# Patient Record
Sex: FEMALE | Race: WHITE | Employment: OTHER | URBAN - METROPOLITAN AREA
[De-identification: names, ages, dates, MRNs, and addresses within clinical notes are randomized per-mention and may not be internally consistent; named-entity substitution may affect disease eponyms.]

---

## 2018-07-15 ENCOUNTER — HOSPITAL ENCOUNTER (EMERGENCY)
Age: 68
Discharge: HOME | End: 2018-07-15
Attending: EMERGENCY MEDICINE
Payer: MEDICARE

## 2018-07-15 VITALS
WEIGHT: 150 LBS | RESPIRATION RATE: 14 BRPM | HEIGHT: 61 IN | HEART RATE: 80 BPM | BODY MASS INDEX: 28.32 KG/M2 | OXYGEN SATURATION: 99 % | TEMPERATURE: 98.6 F | DIASTOLIC BLOOD PRESSURE: 61 MMHG | SYSTOLIC BLOOD PRESSURE: 109 MMHG

## 2018-07-15 DIAGNOSIS — S81.812A LACERATION OF LEFT LOWER EXTREMITY, INITIAL ENCOUNTER: Primary | ICD-10-CM

## 2018-07-15 PROCEDURE — 2500000003 HC RX 250 WO HCPCS: Performed by: EMERGENCY MEDICINE

## 2018-07-15 PROCEDURE — 90471 IMMUNIZATION ADMIN: CPT | Performed by: EMERGENCY MEDICINE

## 2018-07-15 PROCEDURE — 99282 EMERGENCY DEPT VISIT SF MDM: CPT

## 2018-07-15 PROCEDURE — 90715 TDAP VACCINE 7 YRS/> IM: CPT | Performed by: EMERGENCY MEDICINE

## 2018-07-15 PROCEDURE — 12002 RPR S/N/AX/GEN/TRNK2.6-7.5CM: CPT

## 2018-07-15 PROCEDURE — 6360000002 HC RX W HCPCS: Performed by: EMERGENCY MEDICINE

## 2018-07-15 RX ORDER — LIDOCAINE HYDROCHLORIDE AND EPINEPHRINE 10; 10 MG/ML; UG/ML
20 INJECTION, SOLUTION INFILTRATION; PERINEURAL ONCE
Status: COMPLETED | OUTPATIENT
Start: 2018-07-15 | End: 2018-07-15

## 2018-07-15 RX ADMIN — TETANUS TOXOID, REDUCED DIPHTHERIA TOXOID AND ACELLULAR PERTUSSIS VACCINE, ADSORBED 0.5 ML: 5; 2.5; 8; 8; 2.5 SUSPENSION INTRAMUSCULAR at 20:23

## 2018-07-15 RX ADMIN — LIDOCAINE HYDROCHLORIDE,EPINEPHRINE BITARTRATE 20 ML: 10; .01 INJECTION, SOLUTION INFILTRATION; PERINEURAL at 20:23

## 2018-07-15 ASSESSMENT — ENCOUNTER SYMPTOMS
SINUS PRESSURE: 0
BACK PAIN: 0
ABDOMINAL DISTENTION: 0
NAUSEA: 0
EYE PAIN: 0
SHORTNESS OF BREATH: 0
SORE THROAT: 0
DIARRHEA: 0
VOMITING: 0
WHEEZING: 0
EYE REDNESS: 0
COUGH: 0
EYE DISCHARGE: 0

## 2018-07-15 ASSESSMENT — PAIN SCALES - GENERAL
PAINLEVEL_OUTOF10: 0
PAINLEVEL_OUTOF10: 0

## 2018-07-16 NOTE — ED PROVIDER NOTES
ED ATTENDING NOTE    I saw and examined the patient. I discussed the history and examination with the resident and agree with the plan of care         HPI: Pt presents with laceration to LE, cut on a piece of glass, no FB, pt denies f/c, ha, cp, sob, cough, ap, n/v/d. Pt is lying in bed in no acute distress. --------------------------------------------- PAST HISTORY ---------------------------------------------  Past Medical History:  has a past medical history of Acid reflux; Hyperlipidemia; Hypertension; OCD (obsessive compulsive disorder); and Thyroid disease. Past Surgical History:  has a past surgical history that includes  section; Vocal Cord Augmentation W/Radiesse Inj; Total knee arthroplasty (Left); Ankle surgery (Right); Endoscopy, colon, diagnostic; and Colonoscopy. Social History:  reports that she has never smoked. She has never used smokeless tobacco. She reports that she does not drink alcohol or use drugs. Family History: family history includes Cancer in her brother; Heart Attack (age of onset: 50) in her father. The patients home medications have been reviewed. Allergies: Patient has no known allergies.     ROS: Review HPI for other details  Details in electronic record may supersede details below    Gen: no chills, fever  Eyes: no discharge, no change vision  ENT: no sore throat, no rhinitis  Cardiac: no chest pain, no palpitations  Resp: no sob, no cough  GI: no abd pain, no nausea, vomiting, or diarrhea  : no dysuria, urgency, change in color  MS: no back pain, joint pain, no falls, no trauma   Skin: no rash, no itch (+) laceration  Neuro: no dizziness, headache, no focal paralysis or parasthesia  Psych: no hallucinations, no depression  Heme: no bruising, no bleeding  Other relevant systems reviewed and negative    Physical brief exam: See HPI for other details  Details in electronic record may supersede details below    Vital signs reviewed, please refer to
There is no rebound and no guarding. Musculoskeletal: She exhibits no edema. Neurological: She is alert and oriented to person, place, and time. No cranial nerve deficit. Coordination normal.   Skin: Skin is warm and dry. 4 cm straight laceration to LLE without evidence of any retained foreign body   Nursing note and vitals reviewed. Procedures    MDM    Laceration without suspicion of foreign body. Primary repair performed. Tetanus updated. She will f/u with her PMD to have sutures removed.           --------------------------------------------- PAST HISTORY ---------------------------------------------  Past Medical History:  has a past medical history of Acid reflux; Hyperlipidemia; Hypertension; OCD (obsessive compulsive disorder); and Thyroid disease. Past Surgical History:  has a past surgical history that includes  section; Vocal Cord Augmentation W/Radiesse Inj; Total knee arthroplasty (Left); Ankle surgery (Right); Endoscopy, colon, diagnostic; and Colonoscopy. Social History:  reports that she has never smoked. She has never used smokeless tobacco. She reports that she does not drink alcohol or use drugs. Family History: family history includes Cancer in her brother; Heart Attack (age of onset: 50) in her father. The patients home medications have been reviewed. Allergies: Patient has no known allergies. -------------------------------------------------- RESULTS -------------------------------------------------  Labs:  No results found for this visit on 07/15/18. Radiology:  No orders to display       ------------------------- NURSING NOTES AND VITALS REVIEWED ---------------------------  Date / Time Roomed:  7/15/2018  7:54 PM  ED Bed Assignment:      The nursing notes within the ED encounter and vital signs as below have been reviewed.    /61   Pulse 80   Temp 98.6 °F (37 °C) (Oral)   Resp 14   Ht 5' 1\" (1.549 m)   Wt 150 lb (68 kg)   SpO2 99%

## 2018-09-10 ENCOUNTER — HOSPITAL ENCOUNTER (OUTPATIENT)
Age: 68
Discharge: HOME | End: 2018-09-12

## 2018-09-10 LAB
ABO/RH: NORMAL
ANION GAP SERPL CALCULATED.3IONS-SCNC: 12 MMOL/L (ref 7–16)
ANTIBODY SCREEN: NORMAL
APTT: 28.6 SEC (ref 24.5–35.1)
BASOPHILS ABSOLUTE: 0.05 E9/L (ref 0–0.2)
BASOPHILS RELATIVE PERCENT: 0.7 % (ref 0–2)
BILIRUBIN URINE: NEGATIVE
BLOOD, URINE: NEGATIVE
BUN BLDV-MCNC: 22 MG/DL (ref 8–23)
CALCIUM SERPL-MCNC: 9.5 MG/DL (ref 8.6–10.2)
CHLORIDE BLD-SCNC: 103 MMOL/L (ref 98–107)
CLARITY: CLEAR
CO2: 25 MMOL/L (ref 22–29)
COLOR: YELLOW
CREAT SERPL-MCNC: 1.1 MG/DL (ref 0.5–1)
EOSINOPHILS ABSOLUTE: 0.28 E9/L (ref 0.05–0.5)
EOSINOPHILS RELATIVE PERCENT: 3.7 % (ref 0–6)
GFR AFRICAN AMERICAN: >60
GFR NON-AFRICAN AMERICAN: 50 ML/MIN/1.73
GLUCOSE BLD-MCNC: 90 MG/DL (ref 74–109)
GLUCOSE URINE: NEGATIVE MG/DL
HCT VFR BLD CALC: 42.4 % (ref 34–48)
HEMOGLOBIN: 13.5 G/DL (ref 11.5–15.5)
IMMATURE GRANULOCYTES #: 0.02 E9/L
IMMATURE GRANULOCYTES %: 0.3 % (ref 0–5)
INR BLD: 1
KETONES, URINE: NEGATIVE MG/DL
LEUKOCYTE ESTERASE, URINE: NEGATIVE
LYMPHOCYTES ABSOLUTE: 2.06 E9/L (ref 1.5–4)
LYMPHOCYTES RELATIVE PERCENT: 27 % (ref 20–42)
MCH RBC QN AUTO: 30.1 PG (ref 26–35)
MCHC RBC AUTO-ENTMCNC: 31.8 % (ref 32–34.5)
MCV RBC AUTO: 94.4 FL (ref 80–99.9)
MONOCYTES ABSOLUTE: 0.6 E9/L (ref 0.1–0.95)
MONOCYTES RELATIVE PERCENT: 7.9 % (ref 2–12)
NEUTROPHILS ABSOLUTE: 4.61 E9/L (ref 1.8–7.3)
NEUTROPHILS RELATIVE PERCENT: 60.4 % (ref 43–80)
NITRITE, URINE: NEGATIVE
PDW BLD-RTO: 13.1 FL (ref 11.5–15)
PH UA: 6 (ref 5–9)
PLATELET # BLD: 211 E9/L (ref 130–450)
PMV BLD AUTO: 10.6 FL (ref 7–12)
POTASSIUM SERPL-SCNC: 4.6 MMOL/L (ref 3.5–5)
PROTEIN UA: NEGATIVE MG/DL
PROTHROMBIN TIME: 11.2 SEC (ref 9.3–12.4)
RBC # BLD: 4.49 E12/L (ref 3.5–5.5)
SODIUM BLD-SCNC: 140 MMOL/L (ref 132–146)
SPECIFIC GRAVITY UA: 1.02 (ref 1–1.03)
UROBILINOGEN, URINE: 0.2 E.U./DL
WBC # BLD: 7.6 E9/L (ref 4.5–11.5)

## 2018-09-10 PROCEDURE — 87088 URINE BACTERIA CULTURE: CPT

## 2018-09-10 PROCEDURE — 85025 COMPLETE CBC W/AUTO DIFF WBC: CPT

## 2018-09-10 PROCEDURE — 81003 URINALYSIS AUTO W/O SCOPE: CPT

## 2018-09-10 PROCEDURE — 87081 CULTURE SCREEN ONLY: CPT

## 2018-09-10 PROCEDURE — 86900 BLOOD TYPING SEROLOGIC ABO: CPT

## 2018-09-10 PROCEDURE — 86901 BLOOD TYPING SEROLOGIC RH(D): CPT

## 2018-09-10 PROCEDURE — 85730 THROMBOPLASTIN TIME PARTIAL: CPT

## 2018-09-10 PROCEDURE — 80048 BASIC METABOLIC PNL TOTAL CA: CPT

## 2018-09-10 PROCEDURE — 86850 RBC ANTIBODY SCREEN: CPT

## 2018-09-10 PROCEDURE — 85610 PROTHROMBIN TIME: CPT

## 2018-09-12 LAB — MRSA CULTURE ONLY: NORMAL

## 2018-09-13 LAB — URINE CULTURE, ROUTINE: NORMAL

## 2018-09-19 ENCOUNTER — HOSPITAL ENCOUNTER (OUTPATIENT)
Age: 68
Discharge: HOME | End: 2018-09-21

## 2018-09-19 PROCEDURE — 88311 DECALCIFY TISSUE: CPT

## 2018-09-19 PROCEDURE — 88305 TISSUE EXAM BY PATHOLOGIST: CPT

## 2018-09-20 ENCOUNTER — HOSPITAL ENCOUNTER (OUTPATIENT)
Age: 68
Discharge: HOME | End: 2018-09-22

## 2018-09-20 LAB
ANION GAP SERPL CALCULATED.3IONS-SCNC: 12 MMOL/L (ref 7–16)
BUN BLDV-MCNC: 18 MG/DL (ref 8–23)
CALCIUM SERPL-MCNC: 8.9 MG/DL (ref 8.6–10.2)
CHLORIDE BLD-SCNC: 97 MMOL/L (ref 98–107)
CO2: 24 MMOL/L (ref 22–29)
CREAT SERPL-MCNC: 1.3 MG/DL (ref 0.5–1)
GFR AFRICAN AMERICAN: 50
GFR NON-AFRICAN AMERICAN: 41 ML/MIN/1.73
GLUCOSE BLD-MCNC: 99 MG/DL (ref 74–109)
HCT VFR BLD CALC: 36.6 % (ref 34–48)
HEMOGLOBIN: 11.8 G/DL (ref 11.5–15.5)
MCH RBC QN AUTO: 30.6 PG (ref 26–35)
MCHC RBC AUTO-ENTMCNC: 32.2 % (ref 32–34.5)
MCV RBC AUTO: 95.1 FL (ref 80–99.9)
PDW BLD-RTO: 13 FL (ref 11.5–15)
PLATELET # BLD: 218 E9/L (ref 130–450)
PMV BLD AUTO: 11.2 FL (ref 7–12)
POTASSIUM SERPL-SCNC: 5.3 MMOL/L (ref 3.5–5)
RBC # BLD: 3.85 E12/L (ref 3.5–5.5)
SODIUM BLD-SCNC: 133 MMOL/L (ref 132–146)
WBC # BLD: 14.7 E9/L (ref 4.5–11.5)

## 2018-09-20 PROCEDURE — 80048 BASIC METABOLIC PNL TOTAL CA: CPT

## 2018-09-20 PROCEDURE — 85027 COMPLETE CBC AUTOMATED: CPT

## 2018-09-21 ENCOUNTER — HOSPITAL ENCOUNTER (OUTPATIENT)
Age: 68
Discharge: HOME | End: 2018-09-23

## 2018-09-21 LAB
ANION GAP SERPL CALCULATED.3IONS-SCNC: 12 MMOL/L (ref 7–16)
BUN BLDV-MCNC: 10 MG/DL (ref 8–23)
CALCIUM SERPL-MCNC: 9.1 MG/DL (ref 8.6–10.2)
CHLORIDE BLD-SCNC: 100 MMOL/L (ref 98–107)
CO2: 26 MMOL/L (ref 22–29)
CREAT SERPL-MCNC: 1.1 MG/DL (ref 0.5–1)
GFR AFRICAN AMERICAN: >60
GFR NON-AFRICAN AMERICAN: 50 ML/MIN/1.73
GLUCOSE BLD-MCNC: 100 MG/DL (ref 74–109)
HCT VFR BLD CALC: 34.6 % (ref 34–48)
HEMOGLOBIN: 11.1 G/DL (ref 11.5–15.5)
MCH RBC QN AUTO: 30.4 PG (ref 26–35)
MCHC RBC AUTO-ENTMCNC: 32.1 % (ref 32–34.5)
MCV RBC AUTO: 94.8 FL (ref 80–99.9)
PDW BLD-RTO: 12.8 FL (ref 11.5–15)
PLATELET # BLD: 195 E9/L (ref 130–450)
PMV BLD AUTO: 11.1 FL (ref 7–12)
POTASSIUM SERPL-SCNC: 4.2 MMOL/L (ref 3.5–5)
RBC # BLD: 3.65 E12/L (ref 3.5–5.5)
SODIUM BLD-SCNC: 138 MMOL/L (ref 132–146)
WBC # BLD: 9.6 E9/L (ref 4.5–11.5)

## 2018-09-21 PROCEDURE — 85027 COMPLETE CBC AUTOMATED: CPT

## 2018-09-21 PROCEDURE — 80048 BASIC METABOLIC PNL TOTAL CA: CPT

## 2018-09-25 ENCOUNTER — HOSPITAL ENCOUNTER (OUTPATIENT)
Age: 68
Discharge: HOME | End: 2018-09-27
Payer: MEDICARE

## 2018-09-25 ENCOUNTER — HOSPITAL ENCOUNTER (OUTPATIENT)
Dept: GENERAL RADIOLOGY | Age: 68
Discharge: HOME | End: 2018-09-27
Payer: MEDICARE

## 2018-09-25 DIAGNOSIS — M17.11 ARTHRITIS OF KNEE, RIGHT: ICD-10-CM

## 2018-09-25 PROCEDURE — 73560 X-RAY EXAM OF KNEE 1 OR 2: CPT

## 2018-10-16 ENCOUNTER — HOSPITAL ENCOUNTER (OUTPATIENT)
Dept: GENERAL RADIOLOGY | Age: 68
Discharge: HOME | End: 2018-10-18
Payer: MEDICARE

## 2018-10-16 ENCOUNTER — HOSPITAL ENCOUNTER (OUTPATIENT)
Age: 68
Discharge: HOME | End: 2018-10-18
Payer: MEDICARE

## 2018-10-16 DIAGNOSIS — M17.11 ARTHRITIS OF KNEE, RIGHT: ICD-10-CM

## 2018-10-16 PROCEDURE — 73560 X-RAY EXAM OF KNEE 1 OR 2: CPT

## 2018-10-25 ENCOUNTER — HOSPITAL ENCOUNTER (EMERGENCY)
Age: 68
Discharge: HOME | End: 2018-10-26
Attending: EMERGENCY MEDICINE
Payer: MEDICARE

## 2018-10-25 VITALS
OXYGEN SATURATION: 98 % | WEIGHT: 145 LBS | HEART RATE: 88 BPM | HEIGHT: 61 IN | TEMPERATURE: 97.4 F | BODY MASS INDEX: 27.38 KG/M2 | DIASTOLIC BLOOD PRESSURE: 72 MMHG | RESPIRATION RATE: 18 BRPM | SYSTOLIC BLOOD PRESSURE: 152 MMHG

## 2018-10-25 DIAGNOSIS — S05.01XA BILATERAL CORNEAL ABRASIONS, INITIAL ENCOUNTER: Primary | ICD-10-CM

## 2018-10-25 DIAGNOSIS — S05.02XA BILATERAL CORNEAL ABRASIONS, INITIAL ENCOUNTER: Primary | ICD-10-CM

## 2018-10-25 PROCEDURE — 99283 EMERGENCY DEPT VISIT LOW MDM: CPT

## 2018-10-25 RX ORDER — TETRACAINE HYDROCHLORIDE 5 MG/ML
SOLUTION OPHTHALMIC
Status: DISCONTINUED
Start: 2018-10-25 | End: 2018-10-26 | Stop reason: HOSPADM

## 2018-10-25 ASSESSMENT — PAIN DESCRIPTION - LOCATION: LOCATION: EYE

## 2018-10-25 ASSESSMENT — PAIN SCALES - GENERAL: PAINLEVEL_OUTOF10: 9

## 2018-10-25 ASSESSMENT — PAIN DESCRIPTION - PAIN TYPE: TYPE: ACUTE PAIN

## 2018-10-26 RX ORDER — POLYMYXIN B SULFATE AND TRIMETHOPRIM 1; 10000 MG/ML; [USP'U]/ML
1 SOLUTION OPHTHALMIC EVERY 4 HOURS
Qty: 1 BOTTLE | Refills: 0 | Status: SHIPPED | OUTPATIENT
Start: 2018-10-26 | End: 2018-11-05

## 2018-10-26 ASSESSMENT — VISUAL ACUITY
OS: 20/20
OU: 20/15
OD: 20/20

## 2018-10-26 NOTE — ED PROVIDER NOTES
HPI:   Paul Bronson is a 79 y.o. female presenting to the ED for eye pain, beginning today. The complaint has been constant, moderate in severity, and worsened by nothing. The pt reports having spraying bleach in her basement today when the mist got into her eyes. She complains of pain to her eyes now and states they keep watering. Per pt, worse on left. Pt does not wear contact lenses. She states she tried to wash her eyes out but pain has persisted. No other complaints at this time. ROS:   Pertinent positives and negatives are stated within HPI, all other systems reviewed and are negative.    --------------------------------------------- PAST HISTORY ---------------------------------------------  Past Medical History:  has a past medical history of Acid reflux; Hyperlipidemia; Hypertension; OCD (obsessive compulsive disorder); and Thyroid disease. Past Surgical History:  has a past surgical history that includes  section; Vocal Cord Augmentation W/Radiesse Inj; Total knee arthroplasty (Left); Ankle surgery (Right); Endoscopy, colon, diagnostic; and Colonoscopy. Social History:  reports that she has never smoked. She has never used smokeless tobacco. She reports that she does not drink alcohol or use drugs. Family History: family history includes Cancer in her brother; Heart Attack (age of onset: 50) in her father. The patients home medications have been reviewed. Allergies: Patient has no known allergies. -------------------------------------------------- RESULTS -------------------------------------------------  All laboratory and radiology results have been personally reviewed by myself   LABS:  No results found for this visit on 10/25/18.     RADIOLOGY:  Interpreted by Radiologist.  No orders to display       ------------------------- NURSING NOTES AND VITALS REVIEWED ---------------------------   The nursing notes within the ED encounter and vital signs as below have been

## 2018-11-20 ENCOUNTER — HOSPITAL ENCOUNTER (OUTPATIENT)
Dept: GENERAL RADIOLOGY | Age: 68
Discharge: HOME | End: 2018-11-22
Payer: MEDICARE

## 2018-11-20 ENCOUNTER — HOSPITAL ENCOUNTER (OUTPATIENT)
Age: 68
Discharge: HOME | End: 2018-11-22
Payer: MEDICARE

## 2018-11-20 DIAGNOSIS — M17.11 ARTHRITIS OF KNEE, RIGHT: ICD-10-CM

## 2018-11-20 PROCEDURE — 73560 X-RAY EXAM OF KNEE 1 OR 2: CPT

## 2019-01-08 ENCOUNTER — HOSPITAL ENCOUNTER (OUTPATIENT)
Dept: GENERAL RADIOLOGY | Age: 69
Discharge: HOME | End: 2019-01-10
Payer: MEDICARE

## 2019-01-08 ENCOUNTER — HOSPITAL ENCOUNTER (OUTPATIENT)
Age: 69
Discharge: HOME | End: 2019-01-10
Payer: MEDICARE

## 2019-01-08 DIAGNOSIS — M17.11 ARTHRITIS OF KNEE, RIGHT: ICD-10-CM

## 2019-01-08 PROCEDURE — 73560 X-RAY EXAM OF KNEE 1 OR 2: CPT

## 2019-03-06 ENCOUNTER — HOSPITAL ENCOUNTER (OUTPATIENT)
Dept: NUCLEAR MEDICINE | Age: 69
Discharge: HOME | End: 2019-03-06
Payer: MEDICARE

## 2019-03-06 DIAGNOSIS — T84.032A MECHANICAL LOOSENING OF INTERNAL RIGHT KNEE PROSTHETIC JOINT, INITIAL ENCOUNTER (HCC): ICD-10-CM

## 2019-03-06 PROCEDURE — A9541 TC99M SULFUR COLLOID: HCPCS | Performed by: RADIOLOGY

## 2019-03-06 PROCEDURE — 78102 BONE MARROW IMAGING LTD: CPT

## 2019-03-06 PROCEDURE — 3430000000 HC RX DIAGNOSTIC RADIOPHARMACEUTICAL: Performed by: RADIOLOGY

## 2019-03-06 RX ADMIN — Medication 15 MILLICURIE: at 10:04

## 2019-03-11 ENCOUNTER — HOSPITAL ENCOUNTER (OUTPATIENT)
Dept: NUCLEAR MEDICINE | Age: 69
Discharge: HOME | End: 2019-03-11
Payer: MEDICARE

## 2019-03-11 DIAGNOSIS — T84.032A MECHANICAL LOOSENING OF INTERNAL RIGHT KNEE PROSTHETIC JOINT, INITIAL ENCOUNTER (HCC): ICD-10-CM

## 2019-03-11 PROCEDURE — 3430000000 HC RX DIAGNOSTIC RADIOPHARMACEUTICAL: Performed by: RADIOLOGY

## 2019-03-11 PROCEDURE — A9569 TECHNETIUM TC-99M AUTO WBC: HCPCS | Performed by: RADIOLOGY

## 2019-03-11 PROCEDURE — 78805 NM INFLAMMATORY WBC LIMITED W CERETEC: CPT

## 2019-03-11 RX ADMIN — EXAMETAZIME 8 MILLICURIE: KIT at 14:12

## 2019-03-13 ENCOUNTER — HOSPITAL ENCOUNTER (OUTPATIENT)
Dept: NUCLEAR MEDICINE | Age: 69
Discharge: HOME | End: 2019-03-13
Payer: MEDICARE

## 2019-03-13 DIAGNOSIS — T84.032A MECHANICAL LOOSENING OF INTERNAL RIGHT KNEE PROSTHETIC JOINT, INITIAL ENCOUNTER (HCC): ICD-10-CM

## 2019-03-13 PROCEDURE — 78300 BONE IMAGING LIMITED AREA: CPT

## 2019-03-13 PROCEDURE — A9503 TC99M MEDRONATE: HCPCS | Performed by: RADIOLOGY

## 2019-03-13 PROCEDURE — 3430000000 HC RX DIAGNOSTIC RADIOPHARMACEUTICAL: Performed by: RADIOLOGY

## 2019-03-13 RX ORDER — TC 99M MEDRONATE 20 MG/10ML
25 INJECTION, POWDER, LYOPHILIZED, FOR SOLUTION INTRAVENOUS
Status: COMPLETED | OUTPATIENT
Start: 2019-03-13 | End: 2019-03-13

## 2019-03-13 RX ADMIN — Medication 25 MILLICURIE: at 12:07

## 2019-04-09 ENCOUNTER — HOSPITAL ENCOUNTER (OUTPATIENT)
Dept: GENERAL RADIOLOGY | Age: 69
Discharge: HOME | End: 2019-04-11
Payer: MEDICARE

## 2019-04-09 ENCOUNTER — HOSPITAL ENCOUNTER (OUTPATIENT)
Age: 69
Discharge: HOME | End: 2019-04-11
Payer: MEDICARE

## 2019-04-09 DIAGNOSIS — M17.11 ARTHRITIS OF KNEE, RIGHT: ICD-10-CM

## 2019-04-09 PROCEDURE — 73560 X-RAY EXAM OF KNEE 1 OR 2: CPT

## 2019-08-27 ENCOUNTER — HOSPITAL ENCOUNTER (OUTPATIENT)
Dept: GENERAL RADIOLOGY | Age: 69
Discharge: HOME | End: 2019-08-29
Payer: MEDICARE

## 2019-08-27 ENCOUNTER — HOSPITAL ENCOUNTER (OUTPATIENT)
Age: 69
Discharge: HOME | End: 2019-08-29
Payer: MEDICARE

## 2019-08-27 DIAGNOSIS — M17.11 PRIMARY OSTEOARTHRITIS OF RIGHT KNEE: ICD-10-CM

## 2019-08-27 PROCEDURE — 73560 X-RAY EXAM OF KNEE 1 OR 2: CPT

## 2019-09-06 ENCOUNTER — APPOINTMENT (OUTPATIENT)
Dept: GENERAL RADIOLOGY | Age: 69
End: 2019-09-06
Payer: MEDICARE

## 2019-09-06 ENCOUNTER — HOSPITAL ENCOUNTER (OUTPATIENT)
Age: 69
Setting detail: OBSERVATION
Discharge: HOME | End: 2019-09-07
Attending: EMERGENCY MEDICINE | Admitting: INTERNAL MEDICINE
Payer: MEDICARE

## 2019-09-06 DIAGNOSIS — R07.9 CHEST PAIN, UNSPECIFIED TYPE: Primary | ICD-10-CM

## 2019-09-06 PROBLEM — K21.9 GERD (GASTROESOPHAGEAL REFLUX DISEASE): Chronic | Status: ACTIVE | Noted: 2019-09-06

## 2019-09-06 PROBLEM — I10 HYPERTENSION: Chronic | Status: ACTIVE | Noted: 2019-09-06

## 2019-09-06 PROBLEM — E78.5 HYPERLIPIDEMIA: Chronic | Status: ACTIVE | Noted: 2019-09-06

## 2019-09-06 PROBLEM — F42.9 OCD (OBSESSIVE COMPULSIVE DISORDER): Chronic | Status: ACTIVE | Noted: 2019-09-06

## 2019-09-06 PROBLEM — E03.9 HYPOTHYROIDISM: Chronic | Status: ACTIVE | Noted: 2019-09-06

## 2019-09-06 LAB
ANION GAP SERPL CALCULATED.3IONS-SCNC: 13 MMOL/L (ref 7–16)
BASOPHILS ABSOLUTE: 0.03 E9/L (ref 0–0.2)
BASOPHILS RELATIVE PERCENT: 0.3 % (ref 0–2)
BUN BLDV-MCNC: 19 MG/DL (ref 8–23)
CALCIUM SERPL-MCNC: 9.6 MG/DL (ref 8.6–10.2)
CHLORIDE BLD-SCNC: 104 MMOL/L (ref 98–107)
CO2: 23 MMOL/L (ref 22–29)
CREAT SERPL-MCNC: 1.2 MG/DL (ref 0.5–1)
EKG ATRIAL RATE: 71 BPM
EKG P AXIS: 42 DEGREES
EKG P-R INTERVAL: 134 MS
EKG Q-T INTERVAL: 376 MS
EKG QRS DURATION: 66 MS
EKG QTC CALCULATION (BAZETT): 408 MS
EKG R AXIS: 21 DEGREES
EKG T AXIS: 66 DEGREES
EKG VENTRICULAR RATE: 71 BPM
EOSINOPHILS ABSOLUTE: 0.04 E9/L (ref 0.05–0.5)
EOSINOPHILS RELATIVE PERCENT: 0.4 % (ref 0–6)
GFR AFRICAN AMERICAN: 54
GFR NON-AFRICAN AMERICAN: 45 ML/MIN/1.73
GLUCOSE BLD-MCNC: 102 MG/DL (ref 74–99)
HCT VFR BLD CALC: 39.1 % (ref 34–48)
HEMOGLOBIN: 12.6 G/DL (ref 11.5–15.5)
IMMATURE GRANULOCYTES #: 0.05 E9/L
IMMATURE GRANULOCYTES %: 0.5 % (ref 0–5)
INR BLD: 0.9
LYMPHOCYTES ABSOLUTE: 2.13 E9/L (ref 1.5–4)
LYMPHOCYTES RELATIVE PERCENT: 19.3 % (ref 20–42)
MCH RBC QN AUTO: 30.3 PG (ref 26–35)
MCHC RBC AUTO-ENTMCNC: 32.2 % (ref 32–34.5)
MCV RBC AUTO: 94 FL (ref 80–99.9)
MONOCYTES ABSOLUTE: 0.69 E9/L (ref 0.1–0.95)
MONOCYTES RELATIVE PERCENT: 6.3 % (ref 2–12)
NEUTROPHILS ABSOLUTE: 8.07 E9/L (ref 1.8–7.3)
NEUTROPHILS RELATIVE PERCENT: 73.2 % (ref 43–80)
PDW BLD-RTO: 12.8 FL (ref 11.5–15)
PLATELET # BLD: 193 E9/L (ref 130–450)
PMV BLD AUTO: 10 FL (ref 7–12)
POTASSIUM REFLEX MAGNESIUM: 4 MMOL/L (ref 3.5–5)
PROTHROMBIN TIME: 10.8 SEC (ref 9.3–12.4)
RBC # BLD: 4.16 E12/L (ref 3.5–5.5)
SODIUM BLD-SCNC: 140 MMOL/L (ref 132–146)
TROPONIN: <0.01 NG/ML (ref 0–0.03)
WBC # BLD: 11 E9/L (ref 4.5–11.5)

## 2019-09-06 PROCEDURE — 99285 EMERGENCY DEPT VISIT HI MDM: CPT

## 2019-09-06 PROCEDURE — 96372 THER/PROPH/DIAG INJ SC/IM: CPT

## 2019-09-06 PROCEDURE — 93005 ELECTROCARDIOGRAM TRACING: CPT | Performed by: EMERGENCY MEDICINE

## 2019-09-06 PROCEDURE — 93010 ELECTROCARDIOGRAM REPORT: CPT | Performed by: INTERNAL MEDICINE

## 2019-09-06 PROCEDURE — G0378 HOSPITAL OBSERVATION PER HR: HCPCS

## 2019-09-06 PROCEDURE — 6370000000 HC RX 637 (ALT 250 FOR IP): Performed by: EMERGENCY MEDICINE

## 2019-09-06 PROCEDURE — 36415 COLL VENOUS BLD VENIPUNCTURE: CPT

## 2019-09-06 PROCEDURE — 85025 COMPLETE CBC W/AUTO DIFF WBC: CPT

## 2019-09-06 PROCEDURE — 2580000003 HC RX 258: Performed by: INTERNAL MEDICINE

## 2019-09-06 PROCEDURE — 6360000002 HC RX W HCPCS: Performed by: INTERNAL MEDICINE

## 2019-09-06 PROCEDURE — 71046 X-RAY EXAM CHEST 2 VIEWS: CPT

## 2019-09-06 PROCEDURE — 84484 ASSAY OF TROPONIN QUANT: CPT

## 2019-09-06 PROCEDURE — 6370000000 HC RX 637 (ALT 250 FOR IP): Performed by: INTERNAL MEDICINE

## 2019-09-06 PROCEDURE — 80048 BASIC METABOLIC PNL TOTAL CA: CPT

## 2019-09-06 PROCEDURE — 85610 PROTHROMBIN TIME: CPT

## 2019-09-06 RX ORDER — MORPHINE SULFATE 4 MG/ML
4 INJECTION, SOLUTION INTRAMUSCULAR; INTRAVENOUS ONCE
Status: DISCONTINUED | OUTPATIENT
Start: 2019-09-06 | End: 2019-09-07 | Stop reason: HOSPADM

## 2019-09-06 RX ORDER — LEVOTHYROXINE SODIUM 0.05 MG/1
50 TABLET ORAL DAILY
Status: ON HOLD | COMMUNITY
End: 2019-09-07 | Stop reason: HOSPADM

## 2019-09-06 RX ORDER — ROPINIROLE 1 MG/1
1 TABLET, FILM COATED ORAL NIGHTLY
Status: DISCONTINUED | OUTPATIENT
Start: 2019-09-06 | End: 2019-09-07 | Stop reason: HOSPADM

## 2019-09-06 RX ORDER — ACETAMINOPHEN 325 MG/1
650 TABLET ORAL EVERY 6 HOURS PRN
Status: DISCONTINUED | OUTPATIENT
Start: 2019-09-06 | End: 2019-09-07 | Stop reason: HOSPADM

## 2019-09-06 RX ORDER — PREDNISONE 1 MG/1
10 TABLET ORAL DAILY
Status: DISCONTINUED | OUTPATIENT
Start: 2019-09-06 | End: 2019-09-07 | Stop reason: HOSPADM

## 2019-09-06 RX ORDER — ROPINIROLE 1 MG/1
1 TABLET, FILM COATED ORAL NIGHTLY
COMMUNITY

## 2019-09-06 RX ORDER — ROSUVASTATIN CALCIUM 20 MG/1
40 TABLET, COATED ORAL EVERY EVENING
Status: DISCONTINUED | OUTPATIENT
Start: 2019-09-06 | End: 2019-09-07 | Stop reason: HOSPADM

## 2019-09-06 RX ORDER — ASPIRIN 81 MG/1
324 TABLET, CHEWABLE ORAL ONCE
Status: COMPLETED | OUTPATIENT
Start: 2019-09-06 | End: 2019-09-06

## 2019-09-06 RX ORDER — SODIUM CHLORIDE 0.9 % (FLUSH) 0.9 %
10 SYRINGE (ML) INJECTION EVERY 12 HOURS SCHEDULED
Status: DISCONTINUED | OUTPATIENT
Start: 2019-09-06 | End: 2019-09-07 | Stop reason: HOSPADM

## 2019-09-06 RX ORDER — LEVOTHYROXINE SODIUM 0.03 MG/1
12.5 TABLET ORAL DAILY
Status: DISCONTINUED | OUTPATIENT
Start: 2019-09-06 | End: 2019-09-07 | Stop reason: HOSPADM

## 2019-09-06 RX ORDER — ASPIRIN 81 MG/1
81 TABLET, CHEWABLE ORAL DAILY
Status: DISCONTINUED | OUTPATIENT
Start: 2019-09-07 | End: 2019-09-07 | Stop reason: HOSPADM

## 2019-09-06 RX ORDER — NITROGLYCERIN 0.4 MG/1
0.4 TABLET SUBLINGUAL EVERY 5 MIN PRN
Status: DISCONTINUED | OUTPATIENT
Start: 2019-09-06 | End: 2019-09-07 | Stop reason: HOSPADM

## 2019-09-06 RX ORDER — SERTRALINE HYDROCHLORIDE 100 MG/1
100 TABLET, FILM COATED ORAL DAILY
Status: DISCONTINUED | OUTPATIENT
Start: 2019-09-07 | End: 2019-09-07 | Stop reason: HOSPADM

## 2019-09-06 RX ORDER — SODIUM CHLORIDE 0.9 % (FLUSH) 0.9 %
10 SYRINGE (ML) INJECTION PRN
Status: DISCONTINUED | OUTPATIENT
Start: 2019-09-06 | End: 2019-09-07 | Stop reason: HOSPADM

## 2019-09-06 RX ORDER — PANTOPRAZOLE SODIUM 40 MG/1
40 GRANULE, DELAYED RELEASE ORAL DAILY
Status: DISCONTINUED | OUTPATIENT
Start: 2019-09-07 | End: 2019-09-07 | Stop reason: HOSPADM

## 2019-09-06 RX ORDER — LISINOPRIL 20 MG/1
20 TABLET ORAL DAILY
Status: DISCONTINUED | OUTPATIENT
Start: 2019-09-07 | End: 2019-09-07 | Stop reason: HOSPADM

## 2019-09-06 RX ORDER — ONDANSETRON 2 MG/ML
4 INJECTION INTRAMUSCULAR; INTRAVENOUS EVERY 6 HOURS PRN
Status: DISCONTINUED | OUTPATIENT
Start: 2019-09-06 | End: 2019-09-07 | Stop reason: HOSPADM

## 2019-09-06 RX ADMIN — ROPINIROLE HYDROCHLORIDE 1 MG: 1 TABLET, FILM COATED ORAL at 20:53

## 2019-09-06 RX ADMIN — ENOXAPARIN SODIUM 40 MG: 40 INJECTION SUBCUTANEOUS at 17:27

## 2019-09-06 RX ADMIN — ASPIRIN 81 MG 324 MG: 81 TABLET ORAL at 13:36

## 2019-09-06 RX ADMIN — Medication 10 ML: at 20:56

## 2019-09-06 RX ADMIN — PREDNISONE 10 MG: 5 TABLET ORAL at 17:26

## 2019-09-06 ASSESSMENT — PAIN DESCRIPTION - PAIN TYPE
TYPE: ACUTE PAIN
TYPE: ACUTE PAIN

## 2019-09-06 ASSESSMENT — PAIN DESCRIPTION - DESCRIPTORS
DESCRIPTORS: DULL;ACHING
DESCRIPTORS: BURNING

## 2019-09-06 ASSESSMENT — HEART SCORE
ECG: 0
ECG: 0

## 2019-09-06 ASSESSMENT — PAIN SCALES - GENERAL
PAINLEVEL_OUTOF10: 0
PAINLEVEL_OUTOF10: 6

## 2019-09-06 ASSESSMENT — PAIN DESCRIPTION - LOCATION
LOCATION: CHEST
LOCATION: CHEST

## 2019-09-06 ASSESSMENT — PAIN DESCRIPTION - FREQUENCY
FREQUENCY: CONTINUOUS
FREQUENCY: CONTINUOUS

## 2019-09-06 ASSESSMENT — PAIN DESCRIPTION - ORIENTATION
ORIENTATION: MID;UPPER
ORIENTATION: MID;UPPER

## 2019-09-06 NOTE — ED NOTES
Bed: 22  Expected date:   Expected time:   Means of arrival:   Comments:  Please clean for [facility], RN  09/06/19 1099

## 2019-09-06 NOTE — H&P
Non-injectable without exudate. NECK:  Supple. No JVD. No thyromegaly. No carotid bruits. HEART:  Regular rate and rhythm without murmur. LUNGS:  Clear to auscultation bilaterally. ABDOMEN:  Positive bowel sounds, soft, and nontender. No rebound or  guarding. No hepatosplenomegaly. No masses. BACK:  Intact without lesions. EXTREMITIES:  Without edema. LYMPH NODES:  No adenopathy noticed. SKIN:  Without rash or lesion. IMPRESSION:  Chest pain, GERD, hyperlipidemia, hypertension,  hypothyroid, and history of OCD. PLAN:  Assign the patient observation status. Cycle cardiac enzymes. Cardiology to see. Cardiac telemetry. DISCHARGE PLAN:  Home when stable.         Ava Bustos DO    D: 09/06/2019 15:42:01       T: 09/06/2019 15:51:16     [email or web address]  Job#: [id number]     Doc#: [id number]    CC:

## 2019-09-07 ENCOUNTER — APPOINTMENT (OUTPATIENT)
Dept: NUCLEAR MEDICINE | Age: 69
End: 2019-09-07
Payer: MEDICARE

## 2019-09-07 VITALS
RESPIRATION RATE: 20 BRPM | HEART RATE: 72 BPM | HEIGHT: 61 IN | OXYGEN SATURATION: 98 % | WEIGHT: 138.4 LBS | TEMPERATURE: 98 F | DIASTOLIC BLOOD PRESSURE: 71 MMHG | SYSTOLIC BLOOD PRESSURE: 121 MMHG | BODY MASS INDEX: 26.13 KG/M2

## 2019-09-07 PROBLEM — R07.2 PRECORDIAL PAIN: Status: ACTIVE | Noted: 2019-09-06

## 2019-09-07 LAB
CHOLESTEROL, TOTAL: 155 MG/DL (ref 0–199)
HCT VFR BLD CALC: 40.3 % (ref 34–48)
HDLC SERPL-MCNC: 61 MG/DL
HEMOGLOBIN: 13 G/DL (ref 11.5–15.5)
LDL CHOLESTEROL CALCULATED: 64 MG/DL (ref 0–99)
LV EF: 84 %
LVEF MODALITY: NORMAL
MCH RBC QN AUTO: 30.5 PG (ref 26–35)
MCHC RBC AUTO-ENTMCNC: 32.3 % (ref 32–34.5)
MCV RBC AUTO: 94.6 FL (ref 80–99.9)
PDW BLD-RTO: 12.7 FL (ref 11.5–15)
PLATELET # BLD: 201 E9/L (ref 130–450)
PMV BLD AUTO: 10.2 FL (ref 7–12)
RBC # BLD: 4.26 E12/L (ref 3.5–5.5)
TRIGL SERPL-MCNC: 151 MG/DL (ref 0–149)
VLDLC SERPL CALC-MCNC: 30 MG/DL
WBC # BLD: 9.9 E9/L (ref 4.5–11.5)

## 2019-09-07 PROCEDURE — 2580000003 HC RX 258: Performed by: INTERNAL MEDICINE

## 2019-09-07 PROCEDURE — 93018 CV STRESS TEST I&R ONLY: CPT | Performed by: INTERNAL MEDICINE

## 2019-09-07 PROCEDURE — 6370000000 HC RX 637 (ALT 250 FOR IP): Performed by: INTERNAL MEDICINE

## 2019-09-07 PROCEDURE — G0378 HOSPITAL OBSERVATION PER HR: HCPCS

## 2019-09-07 PROCEDURE — 85027 COMPLETE CBC AUTOMATED: CPT

## 2019-09-07 PROCEDURE — 3430000000 HC RX DIAGNOSTIC RADIOPHARMACEUTICAL: Performed by: RADIOLOGY

## 2019-09-07 PROCEDURE — 99204 OFFICE O/P NEW MOD 45 MIN: CPT | Performed by: INTERNAL MEDICINE

## 2019-09-07 PROCEDURE — A9500 TC99M SESTAMIBI: HCPCS | Performed by: RADIOLOGY

## 2019-09-07 PROCEDURE — 78452 HT MUSCLE IMAGE SPECT MULT: CPT

## 2019-09-07 PROCEDURE — 93017 CV STRESS TEST TRACING ONLY: CPT

## 2019-09-07 PROCEDURE — 80061 LIPID PANEL: CPT

## 2019-09-07 PROCEDURE — 93016 CV STRESS TEST SUPVJ ONLY: CPT | Performed by: INTERNAL MEDICINE

## 2019-09-07 RX ORDER — LEVOTHYROXINE SODIUM 0.03 MG/1
12.5 TABLET ORAL DAILY
Qty: 30 TABLET | Refills: 0
Start: 2019-09-07

## 2019-09-07 RX ADMIN — Medication 10 MILLICURIE: at 09:55

## 2019-09-07 RX ADMIN — SERTRALINE 100 MG: 100 TABLET, FILM COATED ORAL at 09:08

## 2019-09-07 RX ADMIN — LISINOPRIL 20 MG: 20 TABLET ORAL at 09:08

## 2019-09-07 RX ADMIN — PREDNISONE 10 MG: 5 TABLET ORAL at 09:08

## 2019-09-07 RX ADMIN — LEVOTHYROXINE SODIUM 12.5 MCG: 25 TABLET ORAL at 05:42

## 2019-09-07 RX ADMIN — Medication 10 ML: at 09:09

## 2019-09-07 RX ADMIN — PANTOPRAZOLE SODIUM 40 MG: 40 GRANULE, DELAYED RELEASE ORAL at 05:42

## 2019-09-07 RX ADMIN — ASPIRIN 81 MG 81 MG: 81 TABLET ORAL at 09:08

## 2019-09-07 RX ADMIN — Medication 30 MILLICURIE: at 10:34

## 2019-09-07 ASSESSMENT — PAIN SCALES - GENERAL
PAINLEVEL_OUTOF10: 0
PAINLEVEL_OUTOF10: 0

## 2019-09-07 NOTE — PROGRESS NOTES
nontender, nondistended, no masses  Extrem:  No clubbing, cyanosis, or edema    CBC with Differential:    Lab Results   Component Value Date    WBC 9.9 09/07/2019    RBC 4.26 09/07/2019    HGB 13.0 09/07/2019    HCT 40.3 09/07/2019     09/07/2019    MCV 94.6 09/07/2019    MCH 30.5 09/07/2019    MCHC 32.3 09/07/2019    RDW 12.7 09/07/2019    LYMPHOPCT 19.3 09/06/2019    MONOPCT 6.3 09/06/2019    BASOPCT 0.3 09/06/2019    MONOSABS 0.69 09/06/2019    LYMPHSABS 2.13 09/06/2019    EOSABS 0.04 09/06/2019    BASOSABS 0.03 09/06/2019     CMP:    Lab Results   Component Value Date     09/06/2019    K 4.0 09/06/2019     09/06/2019    CO2 23 09/06/2019    BUN 19 09/06/2019    CREATININE 1.2 09/06/2019    GFRAA 54 09/06/2019    LABGLOM 45 09/06/2019    GLUCOSE 102 09/06/2019    CALCIUM 9.6 09/06/2019     Warfarin PT/INR:    Lab Results   Component Value Date    INR 0.9 09/06/2019    INR 1.0 09/10/2018    PROTIME 10.8 09/06/2019    PROTIME 11.2 09/10/2018       Assessment:    Active Problems:    Chest pain in adult    GERD (gastroesophageal reflux disease)    Hyperlipidemia    Hypothyroidism    Hypertension    OCD (obsessive compulsive disorder)  Resolved Problems:    * No resolved hospital problems.  *      Plan:  Await cardiology input        Fernando Pimentel  7:35 AM  9/7/2019

## 2019-09-07 NOTE — CONSULTS
Not on file    Years of education: Not on file    Highest education level: Not on file   Occupational History    Not on file   Social Needs    Financial resource strain: Not on file    Food insecurity:     Worry: Not on file     Inability: Not on file    Transportation needs:     Medical: Not on file     Non-medical: Not on file   Tobacco Use    Smoking status: Never Smoker    Smokeless tobacco: Never Used   Substance and Sexual Activity    Alcohol use: No    Drug use: No    Sexual activity: Not on file   Lifestyle    Physical activity:     Days per week: Not on file     Minutes per session: Not on file    Stress: Not on file   Relationships    Social connections:     Talks on phone: Not on file     Gets together: Not on file     Attends Rastafarian service: Not on file     Active member of club or organization: Not on file     Attends meetings of clubs or organizations: Not on file     Relationship status: Not on file    Intimate partner violence:     Fear of current or ex partner: Not on file     Emotionally abused: Not on file     Physically abused: Not on file     Forced sexual activity: Not on file   Other Topics Concern    Not on file   Social History Narrative    Not on file       Allergies:  No Known Allergies    Current Medications:  Current Facility-Administered Medications   Medication Dose Route Frequency Provider Last Rate Last Dose    morphine sulfate (PF) injection 4 mg  4 mg Intravenous Once Nanette Else, DO        lisinopril (PRINIVIL;ZESTRIL) tablet 20 mg  20 mg Oral Daily Nanette Else, DO   20 mg at 09/07/19 0908    levothyroxine (SYNTHROID) tablet 12.5 mcg  12.5 mcg Oral Daily Nanette Else, DO   12.5 mcg at 09/07/19 0542    rosuvastatin (CRESTOR) tablet 40 mg  40 mg Oral QPM Nanette Else, DO        sertraline (ZOLOFT) tablet 100 mg  100 mg Oral Daily Nanette Else, DO   100 mg at 09/07/19 0908    pantoprazole sodium (PROTONIX) packet 40 mg  40 mg Oral Daily Pamila Si L Ravi, DO   40 mg at 09/07/19 0542    predniSONE (DELTASONE) tablet 10 mg  10 mg Oral Daily Liseth Ponds, DO   10 mg at 09/07/19 0908    sodium chloride flush 0.9 % injection 10 mL  10 mL Intravenous 2 times per day Liseth Michaelds, DO   10 mL at 09/07/19 0507    sodium chloride flush 0.9 % injection 10 mL  10 mL Intravenous PRN Liseth Kelsey, DO        magnesium hydroxide (MILK OF MAGNESIA) 400 MG/5ML suspension 30 mL  30 mL Oral Daily PRN [facility]        ondansetron [facility]) injection 4 mg  4 mg Intravenous Q6H PRN Liseth Ponmarty, DO        aspirin chewable tablet 81 mg  81 mg Oral Daily Liseth Ponds, DO   81 mg at 09/07/19 0908    enoxaparin (LOVENOX) injection 40 mg  40 mg Subcutaneous Daily Liseth Ponds, DO   40 mg at 09/06/19 1727    nitroGLYCERIN (NITROSTAT) SL tablet 0.4 mg  0.4 mg Sublingual Q5 Min PRN Liseth Ponds, DO        acetaminophen (TYLENOL) tablet 650 mg  650 mg Oral Q6H PRN [facility], DO        rOPINIRole (REQUIP) tablet 1 mg  1 mg Oral Nightly [facility], DO   1 mg at 09/06/19 2053       Physical Exam:  /71   Pulse 72   Temp 98 °F (36.7 °C) (Oral)   Resp 20   Ht 5' 1\" (1.549 m)   Wt 138 lb 6.4 oz (62.8 kg)   SpO2 98%   BMI 26.15 kg/m²   Wt Readings from Last 3 Encounters:   09/07/19 138 lb 6.4 oz (62.8 kg)   10/25/18 145 lb (65.8 kg)   07/15/18 150 lb (68 kg)     Appearance: Awake, alert, no acute respiratory distress  Skin: Intact, no rash  Head: Normocephalic, atraumatic  Eyes: EOMI, no conjunctival erythema  ENMT: No pharyngeal erythema, MMM, no rhinorrhea  Neck: Supple, no elevated JVP, no carotid bruits  Lungs: Clear to auscultation bilaterally. No wheezes, rales, or rhonchi.   Cardiac: Regular rate and rhythm, +S1S2, no murmurs apparent  Abdomen: Soft, nontender, +bowel sounds  Extremities: Moves all extremities x 4, no lower extremity edema  Neurologic: No focal motor deficits apparent, normal mood and affect  Peripheral Pulses: Intact

## 2019-09-08 NOTE — DISCHARGE SUMMARY
[address] [address]                               DISCHARGE SUMMARY    PATIENT NAME: Nada Eisenmenger                 :        1950  MED REC NO:   [id number]                            ROOM:       4573  ACCOUNT NO:   [de-identified]                           ADMIT DATE: 2019  PROVIDER:     Namrata Cameron DO                  [facility] DATE: 2019    DATE OF ADMISSION:  2019    DATE OF DISCHARGE:  2019    DISCHARGE DIAGNOSES:  1. Noncardiac chest pain. 2.  GERD. 3.  Hyperlipidemia. 4.  Hypertension. 5.  Hypothyroidism. 6.  History of OCD. HOSPITAL COURSE:  The patient is a 61-year-old  female who  presented to the emergency room with complaints of chest pain. She was  assigned to observation status. She was seen by Cardiology. Her  cardiac enzymes were normal.  LDL cholesterol was 64. Triglycerides  151. Nuclear cardiac stress test revealed no ischemia with a left  ventricular ejection fraction of 84%. The patient was discharged home  in stable condition on 2019 with recommendation to follow up as an  outpatient with her primary care physician, Dr. Nikita Etienne in two days. Call  office for appointment. DISCHARGE MEDICATIONS:  As per discharge med rec, which include:  1. Levothyroxine 25 mcg one-half tablet daily. 2.  Crestor. 3.  Lisinopril. 4.  Protonix. 5.  Prednisone as directed. 6.  ReQuip. 7.  Sertraline.         Oz Huitron DO    D: 2019 15:02:18       T: 2019 23:54:32     [id number]  Job#: [id number]     Doc#: [id number]    CC:  Dorie Baltazar MD

## 2021-04-08 ENCOUNTER — HOSPITAL ENCOUNTER (OUTPATIENT)
Dept: CT IMAGING | Age: 71
Discharge: HOME | End: 2021-04-10
Payer: MEDICARE

## 2021-04-08 DIAGNOSIS — R10.9 ABDOMINAL PAIN, UNSPECIFIED ABDOMINAL LOCATION: ICD-10-CM

## 2021-04-08 DIAGNOSIS — R11.0 NAUSEA: ICD-10-CM

## 2021-04-08 PROCEDURE — 74177 CT ABD & PELVIS W/CONTRAST: CPT

## 2021-04-08 PROCEDURE — 6360000004 HC RX CONTRAST MEDICATION: Performed by: RADIOLOGY

## 2021-04-08 RX ADMIN — IOPAMIDOL 75 ML: 755 INJECTION, SOLUTION INTRAVENOUS at 10:26

## 2021-04-08 RX ADMIN — IOHEXOL 50 ML: 240 INJECTION, SOLUTION INTRATHECAL; INTRAVASCULAR; INTRAVENOUS; ORAL at 10:26

## 2022-02-24 ENCOUNTER — HOSPITAL ENCOUNTER (OUTPATIENT)
Age: 72
Discharge: HOME | End: 2022-02-26
Payer: MEDICARE

## 2022-02-24 ENCOUNTER — HOSPITAL ENCOUNTER (OUTPATIENT)
Dept: GENERAL RADIOLOGY | Age: 72
Discharge: HOME | End: 2022-02-26
Payer: MEDICARE

## 2022-02-24 DIAGNOSIS — M25.561 RIGHT KNEE PAIN, UNSPECIFIED CHRONICITY: ICD-10-CM

## 2022-02-24 PROCEDURE — 73562 X-RAY EXAM OF KNEE 3: CPT

## 2022-12-10 ENCOUNTER — APPOINTMENT (OUTPATIENT)
Dept: GENERAL RADIOLOGY | Age: 72
DRG: 512 | End: 2022-12-10
Payer: MEDICARE

## 2022-12-10 ENCOUNTER — ANESTHESIA EVENT (OUTPATIENT)
Dept: OPERATING ROOM | Age: 72
End: 2022-12-10
Payer: MEDICARE

## 2022-12-10 ENCOUNTER — ANESTHESIA (OUTPATIENT)
Dept: OPERATING ROOM | Age: 72
End: 2022-12-10
Payer: MEDICARE

## 2022-12-10 ENCOUNTER — HOSPITAL ENCOUNTER (INPATIENT)
Age: 72
LOS: 1 days | Discharge: HOME | DRG: 512 | End: 2022-12-11
Attending: EMERGENCY MEDICINE | Admitting: SURGERY
Payer: MEDICARE
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
## 2022-12-10 DIAGNOSIS — S62.101B OPEN FRACTURE OF RIGHT WRIST, INITIAL ENCOUNTER: ICD-10-CM

## 2022-12-10 PROBLEM — T14.90XA TRAUMA: Status: ACTIVE | Noted: 2022-12-10

## 2022-12-10 LAB
ABO/RH: NORMAL
ANION GAP SERPL CALCULATED.3IONS-SCNC: 12 MMOL/L (ref 7–16)
ANTIBODY SCREEN: NORMAL
APTT: 26 SEC (ref 24.5–35.1)
BASOPHILS ABSOLUTE: 0.03 E9/L (ref 0–0.2)
BASOPHILS RELATIVE PERCENT: 0.3 % (ref 0–2)
BUN BLDV-MCNC: 19 MG/DL (ref 6–23)
CALCIUM SERPL-MCNC: 8.9 MG/DL (ref 8.6–10.2)
CHLORIDE BLD-SCNC: 101 MMOL/L (ref 98–107)
CO2: 21 MMOL/L (ref 22–29)
CREAT SERPL-MCNC: 1.2 MG/DL (ref 0.5–1)
EOSINOPHILS ABSOLUTE: 0.25 E9/L (ref 0.05–0.5)
EOSINOPHILS RELATIVE PERCENT: 2.2 % (ref 0–6)
GFR SERPL CREATININE-BSD FRML MDRD: 48 ML/MIN/1.73
GLUCOSE BLD-MCNC: 101 MG/DL (ref 74–99)
HCT VFR BLD CALC: 33.5 % (ref 34–48)
HCT VFR BLD CALC: 35.1 % (ref 34–48)
HEMOGLOBIN: 10.7 G/DL (ref 11.5–15.5)
HEMOGLOBIN: 11.5 G/DL (ref 11.5–15.5)
IMMATURE GRANULOCYTES #: 0.03 E9/L
IMMATURE GRANULOCYTES %: 0.3 % (ref 0–5)
INR BLD: 1
LACTIC ACID: 0.9 MMOL/L (ref 0.5–2.2)
LYMPHOCYTES ABSOLUTE: 1.55 E9/L (ref 1.5–4)
LYMPHOCYTES RELATIVE PERCENT: 13.8 % (ref 20–42)
MCH RBC QN AUTO: 28.8 PG (ref 26–35)
MCH RBC QN AUTO: 29.1 PG (ref 26–35)
MCHC RBC AUTO-ENTMCNC: 31.9 % (ref 32–34.5)
MCHC RBC AUTO-ENTMCNC: 32.8 % (ref 32–34.5)
MCV RBC AUTO: 88.9 FL (ref 80–99.9)
MCV RBC AUTO: 90.1 FL (ref 80–99.9)
MONOCYTES ABSOLUTE: 0.8 E9/L (ref 0.1–0.95)
MONOCYTES RELATIVE PERCENT: 7.1 % (ref 2–12)
NEUTROPHILS ABSOLUTE: 8.6 E9/L (ref 1.8–7.3)
NEUTROPHILS RELATIVE PERCENT: 76.3 % (ref 43–80)
PDW BLD-RTO: 13.8 FL (ref 11.5–15)
PDW BLD-RTO: 14 FL (ref 11.5–15)
PLATELET # BLD: 167 E9/L (ref 130–450)
PLATELET # BLD: 187 E9/L (ref 130–450)
PMV BLD AUTO: 10.2 FL (ref 7–12)
PMV BLD AUTO: 9.8 FL (ref 7–12)
POTASSIUM SERPL-SCNC: 4.8 MMOL/L (ref 3.5–5)
PROTHROMBIN TIME: 10.7 SEC (ref 9.3–12.4)
RBC # BLD: 3.72 E12/L (ref 3.5–5.5)
RBC # BLD: 3.95 E12/L (ref 3.5–5.5)
SODIUM BLD-SCNC: 134 MMOL/L (ref 132–146)
WBC # BLD: 11.3 E9/L (ref 4.5–11.5)
WBC # BLD: 9.7 E9/L (ref 4.5–11.5)

## 2022-12-10 PROCEDURE — 97530 THERAPEUTIC ACTIVITIES: CPT

## 2022-12-10 PROCEDURE — 73110 X-RAY EXAM OF WRIST: CPT

## 2022-12-10 PROCEDURE — 2580000003 HC RX 258: Performed by: STUDENT IN AN ORGANIZED HEALTH CARE EDUCATION/TRAINING PROGRAM

## 2022-12-10 PROCEDURE — 86901 BLOOD TYPING SEROLOGIC RH(D): CPT

## 2022-12-10 PROCEDURE — 3700000000 HC ANESTHESIA ATTENDED CARE: Performed by: ORTHOPAEDIC SURGERY

## 2022-12-10 PROCEDURE — 85027 COMPLETE CBC AUTOMATED: CPT

## 2022-12-10 PROCEDURE — 99222 1ST HOSP IP/OBS MODERATE 55: CPT | Performed by: ORTHOPAEDIC SURGERY

## 2022-12-10 PROCEDURE — 2720000010 HC SURG SUPPLY STERILE: Performed by: ORTHOPAEDIC SURGERY

## 2022-12-10 PROCEDURE — 0PSH04Z REPOSITION RIGHT RADIUS WITH INTERNAL FIXATION DEVICE, OPEN APPROACH: ICD-10-PCS | Performed by: ORTHOPAEDIC SURGERY

## 2022-12-10 PROCEDURE — 6360000002 HC RX W HCPCS

## 2022-12-10 PROCEDURE — 25609 OPTX DST RD XART FX/EP SEP3+: CPT | Performed by: ORTHOPAEDIC SURGERY

## 2022-12-10 PROCEDURE — 2500000003 HC RX 250 WO HCPCS

## 2022-12-10 PROCEDURE — 90714 TD VACC NO PRESV 7 YRS+ IM: CPT

## 2022-12-10 PROCEDURE — 7100000000 HC PACU RECOVERY - FIRST 15 MIN: Performed by: ORTHOPAEDIC SURGERY

## 2022-12-10 PROCEDURE — 86850 RBC ANTIBODY SCREEN: CPT

## 2022-12-10 PROCEDURE — 3600000004 HC SURGERY LEVEL 4 BASE: Performed by: ORTHOPAEDIC SURGERY

## 2022-12-10 PROCEDURE — 11012 DEB SKIN BONE AT FX SITE: CPT | Performed by: ORTHOPAEDIC SURGERY

## 2022-12-10 PROCEDURE — 7100000001 HC PACU RECOVERY - ADDTL 15 MIN: Performed by: ORTHOPAEDIC SURGERY

## 2022-12-10 PROCEDURE — 85025 COMPLETE CBC W/AUTO DIFF WBC: CPT

## 2022-12-10 PROCEDURE — 85610 PROTHROMBIN TIME: CPT

## 2022-12-10 PROCEDURE — 73100 X-RAY EXAM OF WRIST: CPT

## 2022-12-10 PROCEDURE — 3700000001 HC ADD 15 MINUTES (ANESTHESIA): Performed by: ORTHOPAEDIC SURGERY

## 2022-12-10 PROCEDURE — 99223 1ST HOSP IP/OBS HIGH 75: CPT | Performed by: SURGERY

## 2022-12-10 PROCEDURE — 97165 OT EVAL LOW COMPLEX 30 MIN: CPT

## 2022-12-10 PROCEDURE — 99285 EMERGENCY DEPT VISIT HI MDM: CPT

## 2022-12-10 PROCEDURE — 97161 PT EVAL LOW COMPLEX 20 MIN: CPT

## 2022-12-10 PROCEDURE — 2580000003 HC RX 258

## 2022-12-10 PROCEDURE — 83605 ASSAY OF LACTIC ACID: CPT

## 2022-12-10 PROCEDURE — 6360000002 HC RX W HCPCS: Performed by: STUDENT IN AN ORGANIZED HEALTH CARE EDUCATION/TRAINING PROGRAM

## 2022-12-10 PROCEDURE — 3209999900 FLUORO FOR SURGICAL PROCEDURES

## 2022-12-10 PROCEDURE — 6370000000 HC RX 637 (ALT 250 FOR IP): Performed by: STUDENT IN AN ORGANIZED HEALTH CARE EDUCATION/TRAINING PROGRAM

## 2022-12-10 PROCEDURE — C1713 ANCHOR/SCREW BN/BN,TIS/BN: HCPCS | Performed by: ORTHOPAEDIC SURGERY

## 2022-12-10 PROCEDURE — 85730 THROMBOPLASTIN TIME PARTIAL: CPT

## 2022-12-10 PROCEDURE — 80048 BASIC METABOLIC PNL TOTAL CA: CPT

## 2022-12-10 PROCEDURE — 3600000014 HC SURGERY LEVEL 4 ADDTL 15MIN: Performed by: ORTHOPAEDIC SURGERY

## 2022-12-10 PROCEDURE — 86900 BLOOD TYPING SEROLOGIC ABO: CPT

## 2022-12-10 PROCEDURE — 1200000000 HC SEMI PRIVATE

## 2022-12-10 PROCEDURE — 90471 IMMUNIZATION ADMIN: CPT

## 2022-12-10 PROCEDURE — 36415 COLL VENOUS BLD VENIPUNCTURE: CPT

## 2022-12-10 PROCEDURE — 93005 ELECTROCARDIOGRAM TRACING: CPT | Performed by: EMERGENCY MEDICINE

## 2022-12-10 PROCEDURE — 2709999900 HC NON-CHARGEABLE SUPPLY: Performed by: ORTHOPAEDIC SURGERY

## 2022-12-10 DEVICE — SCREW BNE L16MM DIA2.4MM DST RAD VOLAR S STL ST VAR ANG LOK: Type: IMPLANTABLE DEVICE | Site: WRIST | Status: FUNCTIONAL

## 2022-12-10 DEVICE — SCREW BNE L22MM DIA2.4MM DST RAD VOLAR S STL ST VAR ANG LOK: Type: IMPLANTABLE DEVICE | Site: WRIST | Status: FUNCTIONAL

## 2022-12-10 DEVICE — PLATE BNE W22XL54MM STD 9 H R DST RAD VOLAR S STL VAR ANG: Type: IMPLANTABLE DEVICE | Site: WRIST | Status: FUNCTIONAL

## 2022-12-10 DEVICE — SCREW BNE L14MM DIA2.4MM DST RAD VOLAR S STL ST VAR ANG LOK: Type: IMPLANTABLE DEVICE | Site: WRIST | Status: FUNCTIONAL

## 2022-12-10 DEVICE — SCREW BNE L20MM DIA2.4MM DST RAD VOLAR S STL ST VAR ANG LOK: Type: IMPLANTABLE DEVICE | Site: WRIST | Status: FUNCTIONAL

## 2022-12-10 DEVICE — IMPLANTABLE DEVICE: Type: IMPLANTABLE DEVICE | Site: WRIST | Status: FUNCTIONAL

## 2022-12-10 RX ORDER — SODIUM CHLORIDE 0.9 % (FLUSH) 0.9 %
5-40 SYRINGE (ML) INJECTION PRN
Status: DISCONTINUED | OUTPATIENT
Start: 2022-12-10 | End: 2022-12-11 | Stop reason: HOSPADM

## 2022-12-10 RX ORDER — SODIUM CHLORIDE 0.9 % (FLUSH) 0.9 %
5-40 SYRINGE (ML) INJECTION EVERY 12 HOURS SCHEDULED
Status: DISCONTINUED | OUTPATIENT
Start: 2022-12-10 | End: 2022-12-10 | Stop reason: HOSPADM

## 2022-12-10 RX ORDER — MEPERIDINE HYDROCHLORIDE 25 MG/ML
12.5 INJECTION INTRAMUSCULAR; INTRAVENOUS; SUBCUTANEOUS EVERY 5 MIN PRN
Status: DISCONTINUED | OUTPATIENT
Start: 2022-12-10 | End: 2022-12-10 | Stop reason: HOSPADM

## 2022-12-10 RX ORDER — ONDANSETRON 4 MG/1
4 TABLET, ORALLY DISINTEGRATING ORAL EVERY 8 HOURS PRN
Status: DISCONTINUED | OUTPATIENT
Start: 2022-12-10 | End: 2022-12-11 | Stop reason: HOSPADM

## 2022-12-10 RX ORDER — SODIUM CHLORIDE 0.9 % (FLUSH) 0.9 %
10 SYRINGE (ML) INJECTION PRN
Status: DISCONTINUED | OUTPATIENT
Start: 2022-12-10 | End: 2022-12-11 | Stop reason: HOSPADM

## 2022-12-10 RX ORDER — SODIUM CHLORIDE 9 MG/ML
INJECTION, SOLUTION INTRAVENOUS PRN
Status: DISCONTINUED | OUTPATIENT
Start: 2022-12-10 | End: 2022-12-10 | Stop reason: HOSPADM

## 2022-12-10 RX ORDER — PROPOFOL 10 MG/ML
INJECTION, EMULSION INTRAVENOUS PRN
Status: DISCONTINUED | OUTPATIENT
Start: 2022-12-10 | End: 2022-12-10 | Stop reason: SDUPTHER

## 2022-12-10 RX ORDER — SUCCINYLCHOLINE/SOD CL,ISO/PF 200MG/10ML
SYRINGE (ML) INTRAVENOUS PRN
Status: DISCONTINUED | OUTPATIENT
Start: 2022-12-10 | End: 2022-12-10 | Stop reason: SDUPTHER

## 2022-12-10 RX ORDER — EPHEDRINE SULFATE/0.9% NACL/PF 50 MG/5 ML
SYRINGE (ML) INTRAVENOUS PRN
Status: DISCONTINUED | OUTPATIENT
Start: 2022-12-10 | End: 2022-12-10 | Stop reason: SDUPTHER

## 2022-12-10 RX ORDER — KETOROLAC TROMETHAMINE 30 MG/ML
INJECTION, SOLUTION INTRAMUSCULAR; INTRAVENOUS PRN
Status: DISCONTINUED | OUTPATIENT
Start: 2022-12-10 | End: 2022-12-10 | Stop reason: SDUPTHER

## 2022-12-10 RX ORDER — OXYCODONE HYDROCHLORIDE 5 MG/1
5 TABLET ORAL EVERY 4 HOURS PRN
Status: DISCONTINUED | OUTPATIENT
Start: 2022-12-10 | End: 2022-12-11 | Stop reason: HOSPADM

## 2022-12-10 RX ORDER — TETANUS AND DIPHTHERIA TOXOIDS ADSORBED 2; 2 [LF]/.5ML; [LF]/.5ML
0.5 INJECTION INTRAMUSCULAR ONCE
Status: COMPLETED | OUTPATIENT
Start: 2022-12-10 | End: 2022-12-10

## 2022-12-10 RX ORDER — METHOCARBAMOL 500 MG/1
1000 TABLET, FILM COATED ORAL 4 TIMES DAILY
Status: DISCONTINUED | OUTPATIENT
Start: 2022-12-10 | End: 2022-12-11 | Stop reason: HOSPADM

## 2022-12-10 RX ORDER — ROPINIROLE 1 MG/1
1 TABLET, FILM COATED ORAL NIGHTLY
Status: DISCONTINUED | OUTPATIENT
Start: 2022-12-10 | End: 2022-12-11 | Stop reason: HOSPADM

## 2022-12-10 RX ORDER — SODIUM CHLORIDE 9 MG/ML
INJECTION, SOLUTION INTRAVENOUS PRN
Status: DISCONTINUED | OUTPATIENT
Start: 2022-12-10 | End: 2022-12-11 | Stop reason: HOSPADM

## 2022-12-10 RX ORDER — CEFAZOLIN SODIUM 1 G/3ML
INJECTION, POWDER, FOR SOLUTION INTRAMUSCULAR; INTRAVENOUS PRN
Status: DISCONTINUED | OUTPATIENT
Start: 2022-12-10 | End: 2022-12-10 | Stop reason: SDUPTHER

## 2022-12-10 RX ORDER — OXYCODONE HYDROCHLORIDE 10 MG/1
10 TABLET ORAL EVERY 4 HOURS PRN
Status: DISCONTINUED | OUTPATIENT
Start: 2022-12-10 | End: 2022-12-11 | Stop reason: HOSPADM

## 2022-12-10 RX ORDER — POLYETHYLENE GLYCOL 3350 17 G/17G
17 POWDER, FOR SOLUTION ORAL DAILY
Status: DISCONTINUED | OUTPATIENT
Start: 2022-12-10 | End: 2022-12-11 | Stop reason: HOSPADM

## 2022-12-10 RX ORDER — SENNA PLUS 8.6 MG/1
1 TABLET ORAL DAILY PRN
Status: DISCONTINUED | OUTPATIENT
Start: 2022-12-10 | End: 2022-12-11 | Stop reason: HOSPADM

## 2022-12-10 RX ORDER — FENTANYL CITRATE 50 UG/ML
INJECTION, SOLUTION INTRAMUSCULAR; INTRAVENOUS PRN
Status: DISCONTINUED | OUTPATIENT
Start: 2022-12-10 | End: 2022-12-10 | Stop reason: SDUPTHER

## 2022-12-10 RX ORDER — ACETAMINOPHEN 325 MG/1
650 TABLET ORAL EVERY 6 HOURS
Status: DISCONTINUED | OUTPATIENT
Start: 2022-12-10 | End: 2022-12-11 | Stop reason: HOSPADM

## 2022-12-10 RX ORDER — ONDANSETRON 2 MG/ML
INJECTION INTRAMUSCULAR; INTRAVENOUS PRN
Status: DISCONTINUED | OUTPATIENT
Start: 2022-12-10 | End: 2022-12-10 | Stop reason: SDUPTHER

## 2022-12-10 RX ORDER — SODIUM CHLORIDE, SODIUM LACTATE, POTASSIUM CHLORIDE, CALCIUM CHLORIDE 600; 310; 30; 20 MG/100ML; MG/100ML; MG/100ML; MG/100ML
INJECTION, SOLUTION INTRAVENOUS CONTINUOUS
Status: DISCONTINUED | OUTPATIENT
Start: 2022-12-10 | End: 2022-12-11 | Stop reason: HOSPADM

## 2022-12-10 RX ORDER — ONDANSETRON 2 MG/ML
4 INJECTION INTRAMUSCULAR; INTRAVENOUS EVERY 6 HOURS PRN
Status: DISCONTINUED | OUTPATIENT
Start: 2022-12-10 | End: 2022-12-11 | Stop reason: HOSPADM

## 2022-12-10 RX ORDER — PANTOPRAZOLE SODIUM 40 MG/1
40 TABLET, DELAYED RELEASE ORAL
Status: DISCONTINUED | OUTPATIENT
Start: 2022-12-10 | End: 2022-12-11 | Stop reason: HOSPADM

## 2022-12-10 RX ORDER — MORPHINE SULFATE 2 MG/ML
1 INJECTION, SOLUTION INTRAMUSCULAR; INTRAVENOUS EVERY 5 MIN PRN
Status: DISCONTINUED | OUTPATIENT
Start: 2022-12-10 | End: 2022-12-10 | Stop reason: HOSPADM

## 2022-12-10 RX ORDER — NEOSTIGMINE METHYLSULFATE 1 MG/ML
INJECTION, SOLUTION INTRAVENOUS PRN
Status: DISCONTINUED | OUTPATIENT
Start: 2022-12-10 | End: 2022-12-10 | Stop reason: SDUPTHER

## 2022-12-10 RX ORDER — LEVOTHYROXINE SODIUM 0.03 MG/1
12.5 TABLET ORAL DAILY
Status: DISCONTINUED | OUTPATIENT
Start: 2022-12-10 | End: 2022-12-11 | Stop reason: HOSPADM

## 2022-12-10 RX ORDER — OXYCODONE HYDROCHLORIDE AND ACETAMINOPHEN 5; 325 MG/1; MG/1
1 TABLET ORAL EVERY 6 HOURS PRN
Qty: 28 TABLET | Refills: 0 | Status: SHIPPED | OUTPATIENT
Start: 2022-12-10 | End: 2022-12-17

## 2022-12-10 RX ORDER — SODIUM CHLORIDE 0.9 % (FLUSH) 0.9 %
10 SYRINGE (ML) INJECTION EVERY 12 HOURS SCHEDULED
Status: DISCONTINUED | OUTPATIENT
Start: 2022-12-10 | End: 2022-12-11 | Stop reason: HOSPADM

## 2022-12-10 RX ORDER — MORPHINE SULFATE 2 MG/ML
2 INJECTION, SOLUTION INTRAMUSCULAR; INTRAVENOUS EVERY 5 MIN PRN
Status: DISCONTINUED | OUTPATIENT
Start: 2022-12-10 | End: 2022-12-10 | Stop reason: HOSPADM

## 2022-12-10 RX ORDER — DEXAMETHASONE SODIUM PHOSPHATE 10 MG/ML
INJECTION INTRAMUSCULAR; INTRAVENOUS PRN
Status: DISCONTINUED | OUTPATIENT
Start: 2022-12-10 | End: 2022-12-10 | Stop reason: SDUPTHER

## 2022-12-10 RX ORDER — GLYCOPYRROLATE 0.2 MG/ML
INJECTION INTRAMUSCULAR; INTRAVENOUS PRN
Status: DISCONTINUED | OUTPATIENT
Start: 2022-12-10 | End: 2022-12-10 | Stop reason: SDUPTHER

## 2022-12-10 RX ORDER — SODIUM CHLORIDE 0.9 % (FLUSH) 0.9 %
5-40 SYRINGE (ML) INJECTION PRN
Status: DISCONTINUED | OUTPATIENT
Start: 2022-12-10 | End: 2022-12-10 | Stop reason: HOSPADM

## 2022-12-10 RX ORDER — SODIUM CHLORIDE 0.9 % (FLUSH) 0.9 %
5-40 SYRINGE (ML) INJECTION EVERY 12 HOURS SCHEDULED
Status: DISCONTINUED | OUTPATIENT
Start: 2022-12-10 | End: 2022-12-11 | Stop reason: HOSPADM

## 2022-12-10 RX ORDER — LIDOCAINE HYDROCHLORIDE 20 MG/ML
INJECTION, SOLUTION INTRAVENOUS PRN
Status: DISCONTINUED | OUTPATIENT
Start: 2022-12-10 | End: 2022-12-10 | Stop reason: SDUPTHER

## 2022-12-10 RX ORDER — PHENYLEPHRINE HCL IN 0.9% NACL 1 MG/10 ML
SYRINGE (ML) INTRAVENOUS PRN
Status: DISCONTINUED | OUTPATIENT
Start: 2022-12-10 | End: 2022-12-10 | Stop reason: SDUPTHER

## 2022-12-10 RX ORDER — ROCURONIUM BROMIDE 10 MG/ML
INJECTION, SOLUTION INTRAVENOUS PRN
Status: DISCONTINUED | OUTPATIENT
Start: 2022-12-10 | End: 2022-12-10 | Stop reason: SDUPTHER

## 2022-12-10 RX ADMIN — CEFAZOLIN 2000 MG: 2 INJECTION, POWDER, FOR SOLUTION INTRAMUSCULAR; INTRAVENOUS at 23:40

## 2022-12-10 RX ADMIN — ACETAMINOPHEN 650 MG: 325 TABLET ORAL at 23:40

## 2022-12-10 RX ADMIN — Medication 200 MCG: at 08:49

## 2022-12-10 RX ADMIN — Medication 120 MG: at 07:54

## 2022-12-10 RX ADMIN — ONDANSETRON 4 MG: 2 INJECTION INTRAMUSCULAR; INTRAVENOUS at 08:54

## 2022-12-10 RX ADMIN — Medication 200 MCG: at 08:14

## 2022-12-10 RX ADMIN — OXYCODONE HYDROCHLORIDE 10 MG: 10 TABLET ORAL at 18:54

## 2022-12-10 RX ADMIN — CEFAZOLIN 2000 MG: 2 INJECTION, POWDER, FOR SOLUTION INTRAMUSCULAR; INTRAVENOUS at 06:26

## 2022-12-10 RX ADMIN — Medication 200 MCG: at 08:39

## 2022-12-10 RX ADMIN — SODIUM CHLORIDE, POTASSIUM CHLORIDE, SODIUM LACTATE AND CALCIUM CHLORIDE: 600; 310; 30; 20 INJECTION, SOLUTION INTRAVENOUS at 06:29

## 2022-12-10 RX ADMIN — SODIUM CHLORIDE, PRESERVATIVE FREE 10 ML: 5 INJECTION INTRAVENOUS at 16:56

## 2022-12-10 RX ADMIN — METHOCARBAMOL 1000 MG: 500 TABLET ORAL at 16:55

## 2022-12-10 RX ADMIN — METHOCARBAMOL 1000 MG: 500 TABLET ORAL at 22:07

## 2022-12-10 RX ADMIN — POLYETHYLENE GLYCOL 3350 17 G: 17 POWDER, FOR SOLUTION ORAL at 16:54

## 2022-12-10 RX ADMIN — ROPINIROLE HYDROCHLORIDE 1 MG: 1 TABLET, FILM COATED ORAL at 22:07

## 2022-12-10 RX ADMIN — ACETAMINOPHEN 650 MG: 325 TABLET ORAL at 16:55

## 2022-12-10 RX ADMIN — Medication 100 MCG: at 08:07

## 2022-12-10 RX ADMIN — CEFAZOLIN 2000 MG: 2 INJECTION, POWDER, FOR SOLUTION INTRAMUSCULAR; INTRAVENOUS at 16:54

## 2022-12-10 RX ADMIN — LIDOCAINE HYDROCHLORIDE 20 MG: 20 INJECTION, SOLUTION INTRAVENOUS at 07:54

## 2022-12-10 RX ADMIN — Medication 100 MCG: at 08:04

## 2022-12-10 RX ADMIN — Medication 100 MCG: at 08:10

## 2022-12-10 RX ADMIN — CEFAZOLIN 2 G: 1 INJECTION, POWDER, FOR SOLUTION INTRAMUSCULAR; INTRAVENOUS at 08:01

## 2022-12-10 RX ADMIN — Medication 5 MG: at 08:33

## 2022-12-10 RX ADMIN — Medication 3 MG: at 09:07

## 2022-12-10 RX ADMIN — TETANUS AND DIPHTHERIA TOXOIDS ADSORBED 0.5 ML: 2; 2 INJECTION INTRAMUSCULAR at 06:39

## 2022-12-10 RX ADMIN — FENTANYL CITRATE 100 MCG: 50 INJECTION, SOLUTION INTRAMUSCULAR; INTRAVENOUS at 07:54

## 2022-12-10 RX ADMIN — Medication 5 MG: at 09:01

## 2022-12-10 RX ADMIN — ROCURONIUM BROMIDE 10 MG: 10 INJECTION, SOLUTION INTRAVENOUS at 07:54

## 2022-12-10 RX ADMIN — Medication 100 MCG: at 08:00

## 2022-12-10 RX ADMIN — DEXAMETHASONE SODIUM PHOSPHATE 10 MG: 10 INJECTION INTRAMUSCULAR; INTRAVENOUS at 08:07

## 2022-12-10 RX ADMIN — PROPOFOL 150 MG: 10 INJECTION, EMULSION INTRAVENOUS at 07:54

## 2022-12-10 RX ADMIN — SODIUM CHLORIDE, POTASSIUM CHLORIDE, SODIUM LACTATE AND CALCIUM CHLORIDE: 600; 310; 30; 20 INJECTION, SOLUTION INTRAVENOUS at 08:27

## 2022-12-10 RX ADMIN — GLYCOPYRROLATE 0.6 MG: 0.2 INJECTION, SOLUTION INTRAMUSCULAR; INTRAVENOUS at 09:07

## 2022-12-10 RX ADMIN — KETOROLAC TROMETHAMINE 30 MG: 30 INJECTION, SOLUTION INTRAMUSCULAR; INTRAVENOUS at 09:17

## 2022-12-10 RX ADMIN — ROCURONIUM BROMIDE 10 MG: 10 INJECTION, SOLUTION INTRAVENOUS at 08:29

## 2022-12-10 RX ADMIN — Medication 5 MG: at 08:31

## 2022-12-10 RX ADMIN — SERTRALINE 100 MG: 50 TABLET, FILM COATED ORAL at 16:54

## 2022-12-10 ASSESSMENT — PAIN DESCRIPTION - ORIENTATION
ORIENTATION: RIGHT
ORIENTATION: LOWER
ORIENTATION: RIGHT;LEFT
ORIENTATION: RIGHT
ORIENTATION: LEFT
ORIENTATION: RIGHT

## 2022-12-10 ASSESSMENT — PAIN SCALES - GENERAL
PAINLEVEL_OUTOF10: 3
PAINLEVEL_OUTOF10: 4
PAINLEVEL_OUTOF10: 9
PAINLEVEL_OUTOF10: 4
PAINLEVEL_OUTOF10: 5
PAINLEVEL_OUTOF10: 0
PAINLEVEL_OUTOF10: 0
PAINLEVEL_OUTOF10: 7

## 2022-12-10 ASSESSMENT — PAIN DESCRIPTION - LOCATION
LOCATION: WRIST
LOCATION: ARM
LOCATION: ARM
LOCATION: ARM;HAND
LOCATION: ARM
LOCATION: BACK

## 2022-12-10 ASSESSMENT — PAIN DESCRIPTION - FREQUENCY
FREQUENCY: CONTINUOUS

## 2022-12-10 ASSESSMENT — PAIN DESCRIPTION - PAIN TYPE
TYPE: ACUTE PAIN
TYPE: SURGICAL PAIN
TYPE: SURGICAL PAIN

## 2022-12-10 ASSESSMENT — PAIN - FUNCTIONAL ASSESSMENT: PAIN_FUNCTIONAL_ASSESSMENT: 0-10

## 2022-12-10 ASSESSMENT — PAIN DESCRIPTION - ONSET
ONSET: ON-GOING
ONSET: ON-GOING

## 2022-12-10 ASSESSMENT — PAIN DESCRIPTION - DESCRIPTORS
DESCRIPTORS: ACHING;DISCOMFORT
DESCRIPTORS: DISCOMFORT;THROBBING
DESCRIPTORS: ACHING;DISCOMFORT;SORE
DESCRIPTORS: ACHING
DESCRIPTORS: ACHING;DISCOMFORT;SORE
DESCRIPTORS: ACHING
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
## 2022-12-10 NOTE — ED NOTES
Attempted to call report to [phone] with patient bed assignment, floor unable to accept report at this time.       Albino Michael RN  12/10/22 3447
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
## 2022-12-10 NOTE — DISCHARGE INSTRUCTIONS
Orthopedics Discharge Instructions   Weight bearing Status -non weight bearing right upper extremity  - Operative Extremity  Pain medication Per Prescription  Ice to operative/injured site for 15-30 minutes of each hour for next 3-5 days           Elevate operative/injured limb on 2 pillows at home  Fracture Care -  Splint to remain clean dry and intact, will be removed at first post op office visit  Follow Up in Office in 2 weeks with Dr. Khushi Sanchez, APT: call for appointment      Call the office at [phone] for directions or with any questions. Watch for these significant complications. Call physician if they or any other problems occur:  Fever over 101°, redness, swelling or warmth at the operative site  Unrelieved nausea                          Foul smelling or cloudy drainage at the operative site       Unrelieved pain                   Blood soaked dressing. (Some oozing may be normal)                Numb, pale, blue, cold or tingling extremity     No future appointments. [facility]   Discharge Information    Please call [phone] with any questions or concerns and to schedule a follow up visit in 2 weeks with the trauma Clinic    At the following location:     [address] [facility], [address] [place]     Call Trauma clinic with any further questions. You may take Ibuprofen for mild pain and the prescribed medication for more severe pain. Take prescriptions as directed. No driving on narcotic pain medication. Ice to areas of pain for first 24hrs. Heat to areas of pain after that. Keep wounds clean and dry. Clean abrasions daily and apply bacitracin to lacerations, sutures and abrasions. No driving on narcotic pain medication. Follow up with primary care physician within next 2 weeks.      Return to ER with any nausea, vomiting or fever greater than 101 degrees or any new symptoms such as an in increase in pain, weakness or numbness.

## 2022-12-10 NOTE — OP NOTE
Operative Note      Patient: Jarvis Reina  YOB: 1950  MRN: [id number]    Date of Procedure: 12/10/2022    Pre-Op Diagnosis: Right grade 2 open intra-articular distal radius fracture with significant displacement    Post-Op Diagnosis: Same         Procedure:  1. Irrigation debridement grade 2 open fracture right wrist including skin, subcutaneous tissue, muscle and devitalized bone    2. Open reduction internal fixation intra-articular right significantly displaced distal radius fracture          Surgeon(s):  Kadeem Ott MD    Assistant:   Resident: Kip Serrato DO; Mehreen Pablo DO; Elodia Patterson DO    Anesthesia: General    Estimated Blood Loss (mL): less than 525     Complications: None    Specimens:   * No specimens in log *    Implants:  Implant Name Type Inv.  Item Serial No.  Lot No. LRB No. Used Action   PLATE BNE [id number] STD 9 H R DST RAD VOLAR S STL BRAYDEN ANG - [id number]  PLATE BNE [id number] STD 9 H R DST RAD VOLAR S STL BRAYDEN ANG  DEPUY [employer]-WD  Right 1 Implanted   SCREW BNE L14MM DIA2.4MM DST RAD VOLAR S STL ST [employer] ANG NEL - [id number]  SCREW BNE L14MM DIA2.4MM DST RAD VOLAR S STL ST [employer]RAY[employer] ANG NEL  DEPUY [employer]-WD  Right 1 Implanted   SCREW BNE L16MM DIA2.4MM DST RAD VOLAR S STL ST [employer]RAY[employer] ANG NEL - [id number]  SCREW BNE L16MM DIA2.4MM DST RAD VOLAR S STL ST [employer]RAYDEN ANG NEL  DEPUY [employer]WD  Right 1 Implanted   SCREW BNE L20MM DIA2.4MM DST RAD VOLAR S STL ST [employer]RAY[employer] ANG NEL - [id number]  SCREW BNE L20MM DIA2.4MM DST RAD VOLAR S STL ST [place]RAYDEN ANG NEL  DEPUY [employer]-WD  Right 1 Implanted   SCREW BNE L22MM DIA2.4MM DST RAD VOLAR S STL ST [place]RAY[place] ANG NEL - [id number]  SCREW BNE L22MM DIA2.4MM DST RAD VOLAR S STL ST [place]RAYDEN ANG NEL  DEPUY [employer]-WD  Right 2 Implanted   SCREW CORTX SLFTP W/ T8 2.4X24MM - [id number]  SCREW CORTX SLFTP W/ T8 2.4X24MM  [employer]-WD  Right 1 Implanted   SCREW RON COPELAND SLFT T8 2.7X14MM - [id number]  SCREW RON COPELAND SLSALENAP T8 2.7X14MM  DEPUY [employer]-WD  Right 3 Implanted         Drains: * No LDAs found *    Findings: Use a Synthes precontoured distal radius plate, no significant contamination of the open wound. Detailed Description of Procedure:   Patient was brought to the operating room in a supine position on hospital bed. Patient was transferred to the operating room table by multiple individuals in safe fashion with anesthesia and control patient C-spine area. Once the operating room table all points pressure identified well-padded. An armboard was brought up to her right side. A tourniquet is applied to right upper arm. Her right upper extremity was sterilely prepped and draped in standard orthopedic fashion. A timeout was performed indicating the appropriate identification of the patient, the procedure to be performed, and the side to be performed upon. This is agreed upon all individuals in the room. The open wound was inspected is approximately 8 cm transverse approximately 5 cm from the joint. He had sutures placed at the outside hospital loosely. An Esmarch was applied tourniquet was elevated to 275 mmHg. The sutures were then safely removed. After removal of the sutures the skin edges were debrided with sharp dissection with a scalpel and excisional debridement fashion. We also debrided subcutaneous tissue muscle fascia and eventually bone. Dissection was carried out in the normal Camacho interval.  Once we had dissected out to the fracture site devitalized bone was then safely removed with rongeurs and curettes and excisional debridement fashion. The wound was kayli irrigated sterile saline. The fracture was then reduced with a K wire placed through the styloid to hold out the length. The precontoured plate was put in position with a nonlocking screw placed distally. Once we had the fracture lined up near-anatomic multiple locking screws were placed.   We then placed bicortical screws in the shaft to recreate the volar tilt. The original nonlocking screw was switched out for a locking screw. All K wires were removed. This point time x-ray showed near-anatomic reduction of the distal radius. The DRUJ was stable. The wound was then kayli irrigated once again. The skin was closed with 2-0 Monocryl and 3-0 nylon in the surgically made portion of the incision and 3-0 nylon in a tension-free fashion from the open traumatic wound. Bacitracin Xeroform and a well-padded sterile splint was put in position. Patient was reversed of anesthesia without complication taken to the PACU in stable condition. Postoperative plan:  Strict nonweightbearing right upper extremity    Antibiotics for 24 hours of coverage postoperatively for her open fracture    After antibiotics finished may be discharged home follow-up in the office in 2 weeks for a wound check and potential transition to a Velcro wrist splint depending on her soft tissue condition.     Electronically signed by Deloris Rivas MD on 12/10/2022 at 10:42 AM

## 2022-12-10 NOTE — BRIEF OP NOTE
Brief Postoperative Note      Patient: Candance Carpen  YOB: 1950  MRN: [id number]    Date of Procedure: 12/10/2022    Pre-Op Diagnosis: Fracture [T14. 8XXA]    Post-Op Diagnosis: Same       Procedure(s):  RIGHT WRIST OPEN REDUCTION INTERNAL FIXATION WITH IRRIGATION AND DEBRIDEMENT    Surgeon(s):  Tho Donahue MD    Assistant:  Resident:  Courtney Prado DO; Ruthie Gardner DO; Teja Colbert DO    Anesthesia: General    Estimated Blood Loss (mL): less than 50     Complications: None    Specimens:   * No specimens in log *    Implants:  * No implants in log *      Drains: * No LDAs found *    Findings: see op note    Electronically signed by Courtney Prado DO on 12/10/2022 at 9:12 AM
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
## 2022-12-10 NOTE — ANESTHESIA PRE PROCEDURE
Department of Anesthesiology  Preprocedure Note       Name:  Candance Carpen   Age:  81 y.o.  :  1950                                          MRN:  [id number]         Date:  12/10/2022      Surgeon: Annalise Fuller):  Tho Donahue MD    Procedure: Procedure(s):  RIGHT WRIST OPEN REDUCTION INTERNAL FIXATION W/I &D    Medications prior to admission:   Prior to Admission medications    Medication Sig Start Date End Date Taking? Authorizing Provider   levothyroxine (SYNTHROID) 25 MCG tablet Take 0.5 tablets by mouth Daily 19   Ale Fry, DO   rOPINIRole (REQUIP) 1 MG tablet Take 1 mg by mouth nightly    Historical Provider, MD   lisinopril (PRINIVIL;ZESTRIL) 20 MG tablet Take 20 mg by mouth daily. Historical Provider, MD   rosuvastatin (CRESTOR) 40 MG tablet Take 40 mg by mouth every morning     Historical Provider, MD   sertraline (ZOLOFT) 100 MG tablet Take 100 mg by mouth daily. Historical Provider, MD   Pantoprazole Sodium (PROTONIX) 40 MG PACK packet Take 40 mg by mouth daily.     Historical Provider, MD       Current medications:    Current Facility-Administered Medications   Medication Dose Route Frequency Provider Last Rate Last Admin    levothyroxine (SYNTHROID) tablet 12.5 mcg  12.5 mcg Oral Daily Josselyn A Garett, DO        pantoprazole (PROTONIX) tablet 40 mg  40 mg Oral QAM AC Josselyn A Garett, DO        rOPINIRole (REQUIP) tablet 1 mg  1 mg Oral Nightly Josselyn A Garett, DO        sertraline (ZOLOFT) tablet 100 mg  100 mg Oral Daily Josselynsarah Bajwa, DO        lactated ringers infusion   IntraVENous Continuous Elizabeth [place],  mL/hr at 12/10/22 0748 NoRateChange at 12/10/22 0748    sodium chloride flush 0.9 % injection 10 mL  10 mL IntraVENous 2 times per day Elizabeth [place], DO        sodium chloride flush 0.9 % injection 10 mL  10 mL IntraVENous PRN Josselyn Bajwa, DO        0.9 % sodium chloride infusion   IntraVENous PRN Elizabeth Gonzalez, DO        HYDROmorphone (DILAUDID) injection 0.25 mg  0.25 mg IntraVENous Q3H PRN Ardie Ham, DO        Or    HYDROmorphone (DILAUDID) injection 0.5 mg  0.5 mg IntraVENous Q3H PRN Emili Aragon, DO        methocarbamol (ROBAXIN) tablet 1,000 mg  1,000 mg Oral 4x Daily Josselyn A Garett, DO        ondansetron (ZOFRAN-ODT) disintegrating tablet 4 mg  4 mg Oral Q8H PRN Josselyn A Garett, DO        Or    ondansetron (ZOFRAN) injection 4 mg  4 mg IntraVENous Q6H PRN Josselyn A Garett, DO        polyethylene glycol (GLYCOLAX) packet 17 g  17 g Oral Daily Josselyn A Garett, DO        senna (SENOKOT) tablet 8.6 mg  1 tablet Oral Daily PRN Emili Aragon, DO        lidocaine 1 % injection 20 mL  20 mL Intra-artICUlar See Admin Instructions Renetta Gray DO         Facility-Administered Medications Ordered in Other Encounters   Medication Dose Route Frequency Provider Last Rate Last Admin    ceFAZolin (ANCEF) injection   IntraVENous PRN Edilberto Rodgers RN   2 g at 12/10/22 0801    fentaNYL (SUBLIMAZE) injection   IntraVENous PRN Edilberto Rodgers, RN   100 mcg at 12/10/22 6965    propofol injection   IntraVENous PRN Edilberto Rodgers RN   150 mg at 12/10/22 9370    succinylcholine (ANECTINE) injection   IntraVENous PRN Edilberto Rodgers RN   120 mg at 12/10/22 4525    rocuronium (ZEMURON) injection   IntraVENous PRN Edilberto Rodgers RN   10 mg at 12/10/22 8308    lidocaine (cardiac) (XYLOCAINE) injection   IntraVENous PRN Edilberto Rodgers RN   20 mg at 12/10/22 0754    phenylephrine (LYN-SYNEPHRINE) 1 MG/10ML prefilled syringe (Push Dose)   IntraVENous PRN Edilberto Rodgers RN   200 mcg at 12/10/22 5462    dexamethasone (DECADRON) injection   IntraVENous PRN Edilberto Rodgers RN   10 mg at 12/10/22 9311       Allergies:  No Known Allergies    Problem List:    Patient Active Problem List   Diagnosis Code    Precordial pain R07.2    GERD (gastroesophageal reflux disease) K21.9    Hyperlipidemia E78.5    Hypothyroidism E03.9    Hypertension I10    OCD (obsessive compulsive disorder) F42.9    Trauma T14.90XA       Past Medical History:        Diagnosis Date    Acid reflux     Hyperlipidemia     Hypertension     OCD (obsessive compulsive disorder)     Thyroid disease        Past Surgical History:        Procedure Laterality Date    ANKLE SURGERY Right      SECTION      COLONOSCOPY      ENDOSCOPY, COLON, DIAGNOSTIC      TOTAL KNEE ARTHROPLASTY Left     VOCAL CORD AUGMENTATION W/RADIESSE INJ         Social History:    Social History     Tobacco Use    Smoking status: Never    Smokeless tobacco: Never   Substance Use Topics    Alcohol use: No                                Counseling given: Not Answered      Vital Signs (Current):   Vitals:    12/10/22 0530 12/10/22 0639 12/10/22 0700 12/10/22 0730   BP: (!) 103/59 103/60 (!) 101/57    Pulse: 77 77 76 77   Resp:  14   Temp:       TempSrc:       SpO2: 97% 95% 95% 99%   Weight:       Height:                                                  BP Readings from Last 3 Encounters:   12/10/22 (!) 101/57   19 121/71   10/25/18 (!) 152/72       NPO Status:                                                                                 BMI:   Wt Readings from Last 3 Encounters:   12/10/22 145 lb (65.8 kg)   19 138 lb 6.4 oz (62.8 kg)   10/25/18 145 lb (65.8 kg)     Body mass index is 27.4 kg/m².     CBC:   Lab Results   Component Value Date/Time    WBC 11.3 12/10/2022 03:57 AM    RBC 3.95 12/10/2022 03:57 AM    HGB 11.5 12/10/2022 03:57 AM    HCT 35.1 12/10/2022 03:57 AM    MCV 88.9 12/10/2022 03:57 AM    RDW 13.8 12/10/2022 03:57 AM     12/10/2022 03:57 AM       CMP:   Lab Results   Component Value Date/Time     12/10/2022 03:57 AM    K 4.8 12/10/2022 03:57 AM    K 4.0 2019 01:33 PM     12/10/2022 03:57 AM    CO2 21 12/10/2022 03:57 AM    BUN 19 12/10/2022 03:57 AM    CREATININE 1.2 12/10/2022 03:57 AM    GFRAA 54 2019 01:33 PM    LABGLOM 48 12/10/2022 03:57 AM    GLUCOSE 101 12/10/2022 03:57 AM    CALCIUM 8.9 12/10/2022 03:57 AM       POC Tests: No results for input(s): POCGLU, POCNA, POCK, POCCL, POCBUN, POCHEMO, POCHCT in the last 72 hours. Coags:   Lab Results   Component Value Date/Time    PROTIME 10.7 12/10/2022 05:37 AM    INR 1.0 12/10/2022 05:37 AM    APTT 26.0 12/10/2022 05:37 AM       HCG (If Applicable): No results found for: PREGTESTUR, PREGSERUM, HCG, HCGQUANT     ABGs: No results found for: PHART, PO2ART, CGA9SJX, BAR6VCB, BEART, K0QYCKJQ     Type & Screen (If Applicable):  No results found for: LABABO, LABRH    Drug/Infectious Status (If Applicable):  No results found for: HIV, HEPCAB    COVID-19 Screening (If Applicable): No results found for: COVID19    EKG 12/10/22:  Narrative & Impression    Normal sinus rhythm  Normal ECG  No previous ECGs available     Cardiac Stress Test 9/7/19:  Impression:  1. No chest pain  2. Exercise time: 7:32, MPHR: 92%  3. No new arrhythmias  4. Non-diagnostic stress EKG (+artifact on stress EKGs)  5. Nuclear images pending    Anesthesia Evaluation  Patient summary reviewed and Nursing notes reviewed no history of anesthetic complications:   Airway: Mallampati: III  TM distance: >3 FB   Neck ROM: full  Mouth opening: < 3 FB   Dental:    (+) edentulous      Pulmonary:Negative Pulmonary ROS and normal exam  breath sounds clear to auscultation                             Cardiovascular:  Exercise tolerance: good (>4 METS),   (+) hypertension:,       ECG reviewed  Rhythm: regular  Rate: normal    Stress test reviewed       Beta Blocker:  Not on Beta Blocker         Neuro/Psych:   (+) psychiatric history (OCD):            GI/Hepatic/Renal:   (+) GERD:,           Endo/Other:    (+) hypothyroidism::., .                 Abdominal:             Vascular: negative vascular ROS. Other Findings:           Anesthesia Plan      general     ASA 3 - emergent     (20 gauge IV left hand)  Induction: intravenous.     MIPS: Postoperative opioids intended and Prophylactic antiemetics administered. Anesthetic plan and risks discussed with patient and spouse. Use of blood products discussed with patient and spouse whom consented to blood products. Plan discussed with CRNA and attending.                     Luis Lopez RN   12/10/2022

## 2022-12-10 NOTE — ANESTHESIA POSTPROCEDURE EVALUATION
Department of Anesthesiology  Postprocedure Note    Patient: Mason Boeck  MRN: [id number]  YOB: 1950  Date of evaluation: 12/10/2022      Procedure Summary     Date: 12/10/22 Room / Location: [facility] 09 / [facility]    Anesthesia Start: 5344 Anesthesia Stop: 1730    Procedure: RIGHT WRIST OPEN REDUCTION INTERNAL FIXATION WITH IRRIGATION AND DEBRIDEMENT (Right: Wrist) Diagnosis:       Fracture      (Fracture [T14. Nimisha Rile)    Surgeons: Jacob Joe MD Responsible Provider: Jessika Shaver MD    Anesthesia Type: General ASA Status: 3 - Emergent          Anesthesia Type: General    Jerri Phase I: Jerri Score: 10    Jerri Phase II:        Anesthesia Post Evaluation    Patient location during evaluation: PACU  Patient participation: complete - patient participated  Level of consciousness: awake  Pain score: 3  Airway patency: patent  Nausea & Vomiting: no nausea and no vomiting  Complications: no  Cardiovascular status: blood pressure returned to baseline  Respiratory status: acceptable  Hydration status: euvolemic
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
## 2022-12-10 NOTE — Clinical Note
Vital signs are within acceptable limits and stable, SBAR handoff/transfer of care to RN.     Patient Handoff Status:  Level of Response: --  Oxygen device: --  Airway Status: --  BP: 104/58  Heart Rate: 77  SpO2: 100 %  Resp: 14  Temp: 36.5 °C (97.7 °F)  Temp Source: Temporal

## 2022-12-11 VITALS
RESPIRATION RATE: 16 BRPM | SYSTOLIC BLOOD PRESSURE: 93 MMHG | BODY MASS INDEX: 27.38 KG/M2 | OXYGEN SATURATION: 98 % | DIASTOLIC BLOOD PRESSURE: 50 MMHG | HEIGHT: 61 IN | WEIGHT: 145 LBS | TEMPERATURE: 97.7 F | HEART RATE: 88 BPM

## 2022-12-11 LAB
ANION GAP SERPL CALCULATED.3IONS-SCNC: 11 MMOL/L (ref 7–16)
BASOPHILS ABSOLUTE: 0.03 E9/L (ref 0–0.2)
BASOPHILS RELATIVE PERCENT: 0.3 % (ref 0–2)
BUN BLDV-MCNC: 19 MG/DL (ref 6–23)
CALCIUM SERPL-MCNC: 8.7 MG/DL (ref 8.6–10.2)
CHLORIDE BLD-SCNC: 107 MMOL/L (ref 98–107)
CO2: 22 MMOL/L (ref 22–29)
CREAT SERPL-MCNC: 1.3 MG/DL (ref 0.5–1)
EOSINOPHILS ABSOLUTE: 0.04 E9/L (ref 0.05–0.5)
EOSINOPHILS RELATIVE PERCENT: 0.3 % (ref 0–6)
GFR SERPL CREATININE-BSD FRML MDRD: 44 ML/MIN/1.73
GLUCOSE BLD-MCNC: 105 MG/DL (ref 74–99)
HCT VFR BLD CALC: 28.7 % (ref 34–48)
HEMOGLOBIN: 9.2 G/DL (ref 11.5–15.5)
IMMATURE GRANULOCYTES #: 0.06 E9/L
IMMATURE GRANULOCYTES %: 0.5 % (ref 0–5)
LYMPHOCYTES ABSOLUTE: 1.59 E9/L (ref 1.5–4)
LYMPHOCYTES RELATIVE PERCENT: 13.7 % (ref 20–42)
MCH RBC QN AUTO: 29.3 PG (ref 26–35)
MCHC RBC AUTO-ENTMCNC: 32.1 % (ref 32–34.5)
MCV RBC AUTO: 91.4 FL (ref 80–99.9)
MONOCYTES ABSOLUTE: 0.81 E9/L (ref 0.1–0.95)
MONOCYTES RELATIVE PERCENT: 7 % (ref 2–12)
NEUTROPHILS ABSOLUTE: 9.11 E9/L (ref 1.8–7.3)
NEUTROPHILS RELATIVE PERCENT: 78.2 % (ref 43–80)
PDW BLD-RTO: 14.3 FL (ref 11.5–15)
PLATELET # BLD: 148 E9/L (ref 130–450)
PMV BLD AUTO: 11.2 FL (ref 7–12)
POTASSIUM REFLEX MAGNESIUM: 4.8 MMOL/L (ref 3.5–5)
RBC # BLD: 3.14 E12/L (ref 3.5–5.5)
SODIUM BLD-SCNC: 140 MMOL/L (ref 132–146)
WBC # BLD: 11.6 E9/L (ref 4.5–11.5)

## 2022-12-11 PROCEDURE — 80048 BASIC METABOLIC PNL TOTAL CA: CPT

## 2022-12-11 PROCEDURE — 85025 COMPLETE CBC W/AUTO DIFF WBC: CPT

## 2022-12-11 PROCEDURE — 2580000003 HC RX 258

## 2022-12-11 PROCEDURE — 2580000003 HC RX 258: Performed by: STUDENT IN AN ORGANIZED HEALTH CARE EDUCATION/TRAINING PROGRAM

## 2022-12-11 PROCEDURE — 6370000000 HC RX 637 (ALT 250 FOR IP): Performed by: STUDENT IN AN ORGANIZED HEALTH CARE EDUCATION/TRAINING PROGRAM

## 2022-12-11 PROCEDURE — 99232 SBSQ HOSP IP/OBS MODERATE 35: CPT | Performed by: SURGERY

## 2022-12-11 PROCEDURE — 6360000002 HC RX W HCPCS

## 2022-12-11 PROCEDURE — 36415 COLL VENOUS BLD VENIPUNCTURE: CPT

## 2022-12-11 RX ORDER — HEPARIN SODIUM 10000 [USP'U]/ML
5000 INJECTION, SOLUTION INTRAVENOUS; SUBCUTANEOUS EVERY 8 HOURS SCHEDULED
Status: DISCONTINUED | OUTPATIENT
Start: 2022-12-11 | End: 2022-12-11 | Stop reason: HOSPADM

## 2022-12-11 RX ORDER — SODIUM CHLORIDE, SODIUM LACTATE, POTASSIUM CHLORIDE, AND CALCIUM CHLORIDE .6; .31; .03; .02 G/100ML; G/100ML; G/100ML; G/100ML
500 INJECTION, SOLUTION INTRAVENOUS ONCE
Status: COMPLETED | OUTPATIENT
Start: 2022-12-11 | End: 2022-12-11

## 2022-12-11 RX ADMIN — METHOCARBAMOL 1000 MG: 500 TABLET ORAL at 14:58

## 2022-12-11 RX ADMIN — LEVOTHYROXINE SODIUM 12.5 MCG: 0.03 TABLET ORAL at 05:57

## 2022-12-11 RX ADMIN — ACETAMINOPHEN 650 MG: 325 TABLET ORAL at 05:57

## 2022-12-11 RX ADMIN — POLYETHYLENE GLYCOL 3350 17 G: 17 POWDER, FOR SOLUTION ORAL at 09:12

## 2022-12-11 RX ADMIN — SERTRALINE 100 MG: 50 TABLET, FILM COATED ORAL at 09:13

## 2022-12-11 RX ADMIN — HEPARIN SODIUM 5000 UNITS: 10000 INJECTION INTRAVENOUS; SUBCUTANEOUS at 15:01

## 2022-12-11 RX ADMIN — SODIUM CHLORIDE, POTASSIUM CHLORIDE, SODIUM LACTATE AND CALCIUM CHLORIDE 500 ML: 600; 310; 30; 20 INJECTION, SOLUTION INTRAVENOUS at 09:20

## 2022-12-11 RX ADMIN — OXYCODONE HYDROCHLORIDE 10 MG: 10 TABLET ORAL at 15:00

## 2022-12-11 RX ADMIN — ACETAMINOPHEN 650 MG: 325 TABLET ORAL at 10:53

## 2022-12-11 RX ADMIN — METHOCARBAMOL 1000 MG: 500 TABLET ORAL at 09:13

## 2022-12-11 RX ADMIN — PANTOPRAZOLE SODIUM 40 MG: 40 TABLET, DELAYED RELEASE ORAL at 05:57

## 2022-12-11 RX ADMIN — SODIUM CHLORIDE, POTASSIUM CHLORIDE, SODIUM LACTATE AND CALCIUM CHLORIDE: 600; 310; 30; 20 INJECTION, SOLUTION INTRAVENOUS at 09:17

## 2022-12-11 ASSESSMENT — PAIN DESCRIPTION - ONSET
ONSET: ON-GOING
ONSET: ON-GOING

## 2022-12-11 ASSESSMENT — PAIN DESCRIPTION - ORIENTATION
ORIENTATION: RIGHT

## 2022-12-11 ASSESSMENT — PAIN DESCRIPTION - LOCATION
LOCATION: HAND;ARM
LOCATION: HAND;ARM
LOCATION: ARM;HAND

## 2022-12-11 ASSESSMENT — PAIN DESCRIPTION - DESCRIPTORS
DESCRIPTORS: ACHING;DISCOMFORT
DESCRIPTORS: ACHING;DISCOMFORT;SORE
DESCRIPTORS: ACHING;DISCOMFORT

## 2022-12-11 ASSESSMENT — PAIN SCALES - GENERAL
PAINLEVEL_OUTOF10: 1
PAINLEVEL_OUTOF10: 1
PAINLEVEL_OUTOF10: 7
PAINLEVEL_OUTOF10: 2

## 2022-12-11 ASSESSMENT — PAIN DESCRIPTION - PAIN TYPE
TYPE: SURGICAL PAIN
TYPE: SURGICAL PAIN

## 2022-12-11 ASSESSMENT — PAIN DESCRIPTION - FREQUENCY
FREQUENCY: CONTINUOUS
FREQUENCY: CONTINUOUS

## 2022-12-11 NOTE — PROGRESS NOTES
Discharge instructions reviewed with patient and her spouse,  all questions / concerns answered. Waiting for transport.
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
## 2022-12-11 NOTE — PROGRESS NOTES
Department of Orthopedic Surgery  Resident Progress Note    Patient seen and examined. Pain controlled. No new complaints. Denies chest pain, shortness of breath, dizziness/lightheadedness. VITALS:  BP (!) 92/53   Pulse 98   Temp 97 °F (36.1 °C) (Temporal)   Resp 16   Ht 5' 1\" (1.549 m)   Wt 145 lb (65.8 kg)   SpO2 (!) 75%   BMI 27.40 kg/m²     General: alert and oriented to person, place and time, well-developed and well-nourished, in no acute distress    MUSCULOSKELETAL:   right upper extremity:  Dressing C/D/I, splint maintained  Compartments soft and compressible  +AIN/PIN/Ulnar/Median/Radial nerve function intact grossly  +2/4 Radial pulse, Cap refill <2 sec  Sensation intact to touch in radial/ulnar/median nerve distributions to hand    CBC:   Lab Results   Component Value Date/Time    WBC 9.7 12/10/2022 10:42 AM    HGB 10.7 12/10/2022 10:42 AM    HCT 33.5 12/10/2022 10:42 AM     12/10/2022 10:42 AM     PT/INR:    Lab Results   Component Value Date/Time    PROTIME 10.7 12/10/2022 05:37 AM    INR 1.0 12/10/2022 05:37 AM           ASSESSMENT  S/P irrigation debridement and ORIF of open right distal radius fracture on 12/10    PLAN      Continue physical therapy and protocol: NWB -right UE  24 hour abx coverage  Deep venous thrombosis prophylaxis -per primary, early mobilization  From orthopedic standpoint, after patient gets her last dose of IV antibiotics she is okay for discharge home.   She will follow-up with Dr. Joyce Browne in 2 weeks  PT/OT  Pain Control: IV and PO  Monitor H&H  D/C Plan: Okay for discharge from [facility] N Iftikhar Patel DO PGY 2  12/11/22    Electronically signed by Mera Barber DO on 12/11/2022 at 5:35 AM
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
## 2022-12-12 LAB
EKG ATRIAL RATE: 73 BPM
EKG P AXIS: 48 DEGREES
EKG P-R INTERVAL: 136 MS
EKG Q-T INTERVAL: 386 MS
EKG QRS DURATION: 68 MS
EKG QTC CALCULATION (BAZETT): 425 MS
EKG R AXIS: 12 DEGREES
EKG T AXIS: 48 DEGREES
EKG VENTRICULAR RATE: 73 BPM

## 2022-12-12 PROCEDURE — 93010 ELECTROCARDIOGRAM REPORT: CPT | Performed by: INTERNAL MEDICINE

## 2022-12-19 NOTE — PROGRESS NOTES
PCP is Arin Leon MD  Office notified of admission.       Electronically signed by Atilio Gibson RN MSN APRN-NP [facility] NP  [facility] 12/19/2022 4:03 PM
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
## 2022-12-23 ENCOUNTER — APPOINTMENT (OUTPATIENT)
Dept: CT IMAGING | Age: 72
End: 2022-12-23
Payer: MEDICARE

## 2022-12-23 ENCOUNTER — HOSPITAL ENCOUNTER (INPATIENT)
Age: 72
LOS: 7 days | Discharge: HOME | End: 2022-12-30
Attending: EMERGENCY MEDICINE | Admitting: INTERNAL MEDICINE
Payer: MEDICARE

## 2022-12-23 ENCOUNTER — APPOINTMENT (OUTPATIENT)
Dept: GENERAL RADIOLOGY | Age: 72
End: 2022-12-23
Payer: MEDICARE

## 2022-12-23 DIAGNOSIS — N17.9 ACUTE RENAL FAILURE, UNSPECIFIED ACUTE RENAL FAILURE TYPE (HCC): Primary | ICD-10-CM

## 2022-12-23 DIAGNOSIS — E86.0 DEHYDRATION: ICD-10-CM

## 2022-12-23 DIAGNOSIS — K57.32 DIVERTICULITIS OF COLON: ICD-10-CM

## 2022-12-23 LAB
ALBUMIN SERPL-MCNC: 3.7 G/DL (ref 3.5–5.2)
ALP BLD-CCNC: 119 U/L (ref 35–104)
ALT SERPL-CCNC: <5 U/L (ref 0–32)
ANION GAP SERPL CALCULATED.3IONS-SCNC: 14 MMOL/L (ref 7–16)
AST SERPL-CCNC: 15 U/L (ref 0–31)
BACTERIA: NORMAL /HPF
BASOPHILS ABSOLUTE: 0.04 E9/L (ref 0–0.2)
BASOPHILS RELATIVE PERCENT: 0.3 % (ref 0–2)
BILIRUB SERPL-MCNC: 0.6 MG/DL (ref 0–1.2)
BILIRUBIN DIRECT: 0.2 MG/DL (ref 0–0.3)
BILIRUBIN URINE: NEGATIVE
BILIRUBIN, INDIRECT: 0.4 MG/DL (ref 0–1)
BLOOD, URINE: NEGATIVE
BUN BLDV-MCNC: 29 MG/DL (ref 6–23)
CALCIUM SERPL-MCNC: 9.2 MG/DL (ref 8.6–10.2)
CHLORIDE BLD-SCNC: 101 MMOL/L (ref 98–107)
CLARITY: CLEAR
CO2: 21 MMOL/L (ref 22–29)
COLOR: YELLOW
CREAT SERPL-MCNC: 3.4 MG/DL (ref 0.5–1)
EKG ATRIAL RATE: 74 BPM
EKG P AXIS: 38 DEGREES
EKG P-R INTERVAL: 132 MS
EKG Q-T INTERVAL: 390 MS
EKG QRS DURATION: 68 MS
EKG QTC CALCULATION (BAZETT): 432 MS
EKG R AXIS: 10 DEGREES
EKG T AXIS: 52 DEGREES
EKG VENTRICULAR RATE: 74 BPM
EOSINOPHILS ABSOLUTE: 0.12 E9/L (ref 0.05–0.5)
EOSINOPHILS RELATIVE PERCENT: 0.9 % (ref 0–6)
EPITHELIAL CELLS, UA: NORMAL /HPF
GFR SERPL CREATININE-BSD FRML MDRD: 14 ML/MIN/1.73
GLUCOSE BLD-MCNC: 128 MG/DL (ref 74–99)
GLUCOSE URINE: NEGATIVE MG/DL
HCT VFR BLD CALC: 35.6 % (ref 34–48)
HEMOGLOBIN: 11.5 G/DL (ref 11.5–15.5)
IMMATURE GRANULOCYTES #: 0.05 E9/L
IMMATURE GRANULOCYTES %: 0.4 % (ref 0–5)
KETONES, URINE: NEGATIVE MG/DL
LEUKOCYTE ESTERASE, URINE: NEGATIVE
LIPASE: 42 U/L (ref 13–60)
LYMPHOCYTES ABSOLUTE: 1.19 E9/L (ref 1.5–4)
LYMPHOCYTES RELATIVE PERCENT: 9.4 % (ref 20–42)
MAGNESIUM: 1.8 MG/DL (ref 1.6–2.6)
MCH RBC QN AUTO: 29 PG (ref 26–35)
MCHC RBC AUTO-ENTMCNC: 32.3 % (ref 32–34.5)
MCV RBC AUTO: 89.9 FL (ref 80–99.9)
MONOCYTES ABSOLUTE: 1.01 E9/L (ref 0.1–0.95)
MONOCYTES RELATIVE PERCENT: 8 % (ref 2–12)
NEUTROPHILS ABSOLUTE: 10.26 E9/L (ref 1.8–7.3)
NEUTROPHILS RELATIVE PERCENT: 81 % (ref 43–80)
NITRITE, URINE: NEGATIVE
PDW BLD-RTO: 13.6 FL (ref 11.5–15)
PH UA: 5.5 (ref 5–9)
PLATELET # BLD: 225 E9/L (ref 130–450)
PMV BLD AUTO: 9.7 FL (ref 7–12)
POTASSIUM SERPL-SCNC: 4.7 MMOL/L (ref 3.5–5)
PRO-BNP: 101 PG/ML (ref 0–125)
PROTEIN UA: >=300 MG/DL
RBC # BLD: 3.96 E12/L (ref 3.5–5.5)
RBC UA: NORMAL /HPF (ref 0–2)
REASON FOR REJECTION: NORMAL
REJECTED TEST: NORMAL
SODIUM BLD-SCNC: 136 MMOL/L (ref 132–146)
SPECIFIC GRAVITY UA: 1.01 (ref 1–1.03)
TOTAL PROTEIN: 7.1 G/DL (ref 6.4–8.3)
TROPONIN, HIGH SENSITIVITY: 14 NG/L (ref 0–9)
TROPONIN, HIGH SENSITIVITY: 16 NG/L (ref 0–9)
UROBILINOGEN, URINE: 0.2 E.U./DL
WBC # BLD: 12.7 E9/L (ref 4.5–11.5)
WBC UA: NORMAL /HPF (ref 0–5)

## 2022-12-23 PROCEDURE — 2580000003 HC RX 258: Performed by: NURSE PRACTITIONER

## 2022-12-23 PROCEDURE — 99285 EMERGENCY DEPT VISIT HI MDM: CPT

## 2022-12-23 PROCEDURE — 74176 CT ABD & PELVIS W/O CONTRAST: CPT

## 2022-12-23 PROCEDURE — 96361 HYDRATE IV INFUSION ADD-ON: CPT

## 2022-12-23 PROCEDURE — 80048 BASIC METABOLIC PNL TOTAL CA: CPT

## 2022-12-23 PROCEDURE — 96360 HYDRATION IV INFUSION INIT: CPT

## 2022-12-23 PROCEDURE — 85025 COMPLETE CBC W/AUTO DIFF WBC: CPT

## 2022-12-23 PROCEDURE — 2060000000 HC ICU INTERMEDIATE R&B

## 2022-12-23 PROCEDURE — 93010 ELECTROCARDIOGRAM REPORT: CPT | Performed by: INTERNAL MEDICINE

## 2022-12-23 PROCEDURE — 84484 ASSAY OF TROPONIN QUANT: CPT

## 2022-12-23 PROCEDURE — 83690 ASSAY OF LIPASE: CPT

## 2022-12-23 PROCEDURE — 83880 ASSAY OF NATRIURETIC PEPTIDE: CPT

## 2022-12-23 PROCEDURE — 6370000000 HC RX 637 (ALT 250 FOR IP): Performed by: NURSE PRACTITIONER

## 2022-12-23 PROCEDURE — 2580000003 HC RX 258: Performed by: EMERGENCY MEDICINE

## 2022-12-23 PROCEDURE — 87040 BLOOD CULTURE FOR BACTERIA: CPT

## 2022-12-23 PROCEDURE — 6360000002 HC RX W HCPCS: Performed by: NURSE PRACTITIONER

## 2022-12-23 PROCEDURE — 2500000003 HC RX 250 WO HCPCS: Performed by: NURSE PRACTITIONER

## 2022-12-23 PROCEDURE — 83735 ASSAY OF MAGNESIUM: CPT

## 2022-12-23 PROCEDURE — 2500000003 HC RX 250 WO HCPCS: Performed by: EMERGENCY MEDICINE

## 2022-12-23 PROCEDURE — 71045 X-RAY EXAM CHEST 1 VIEW: CPT

## 2022-12-23 PROCEDURE — 93005 ELECTROCARDIOGRAM TRACING: CPT | Performed by: EMERGENCY MEDICINE

## 2022-12-23 PROCEDURE — 80076 HEPATIC FUNCTION PANEL: CPT

## 2022-12-23 PROCEDURE — 6360000002 HC RX W HCPCS: Performed by: EMERGENCY MEDICINE

## 2022-12-23 PROCEDURE — 6360000002 HC RX W HCPCS: Performed by: FAMILY MEDICINE

## 2022-12-23 PROCEDURE — 81001 URINALYSIS AUTO W/SCOPE: CPT

## 2022-12-23 PROCEDURE — 36415 COLL VENOUS BLD VENIPUNCTURE: CPT

## 2022-12-23 RX ORDER — SODIUM CHLORIDE 0.9 % (FLUSH) 0.9 %
5-40 SYRINGE (ML) INJECTION PRN
Status: DISCONTINUED | OUTPATIENT
Start: 2022-12-23 | End: 2022-12-30 | Stop reason: HOSPADM

## 2022-12-23 RX ORDER — ACETAMINOPHEN 650 MG/1
650 SUPPOSITORY RECTAL EVERY 6 HOURS PRN
Status: DISCONTINUED | OUTPATIENT
Start: 2022-12-23 | End: 2022-12-30 | Stop reason: HOSPADM

## 2022-12-23 RX ORDER — HYDRALAZINE HYDROCHLORIDE 20 MG/ML
5 INJECTION INTRAMUSCULAR; INTRAVENOUS EVERY 6 HOURS PRN
Status: DISCONTINUED | OUTPATIENT
Start: 2022-12-23 | End: 2022-12-24

## 2022-12-23 RX ORDER — ROPINIROLE 1 MG/1
1 TABLET, FILM COATED ORAL NIGHTLY
Status: DISCONTINUED | OUTPATIENT
Start: 2022-12-23 | End: 2022-12-30 | Stop reason: HOSPADM

## 2022-12-23 RX ORDER — SERTRALINE HYDROCHLORIDE 100 MG/1
100 TABLET, FILM COATED ORAL DAILY
Status: DISCONTINUED | OUTPATIENT
Start: 2022-12-23 | End: 2022-12-30 | Stop reason: HOSPADM

## 2022-12-23 RX ORDER — MORPHINE SULFATE 2 MG/ML
2 INJECTION, SOLUTION INTRAMUSCULAR; INTRAVENOUS EVERY 4 HOURS PRN
Status: DISCONTINUED | OUTPATIENT
Start: 2022-12-23 | End: 2022-12-24

## 2022-12-23 RX ORDER — LISINOPRIL 20 MG/1
20 TABLET ORAL DAILY
Status: DISCONTINUED | OUTPATIENT
Start: 2022-12-23 | End: 2022-12-30 | Stop reason: HOSPADM

## 2022-12-23 RX ORDER — METRONIDAZOLE 500 MG/100ML
500 INJECTION, SOLUTION INTRAVENOUS EVERY 8 HOURS
Status: DISCONTINUED | OUTPATIENT
Start: 2022-12-23 | End: 2022-12-29

## 2022-12-23 RX ORDER — SODIUM CHLORIDE 9 MG/ML
INJECTION, SOLUTION INTRAVENOUS CONTINUOUS
Status: DISCONTINUED | OUTPATIENT
Start: 2022-12-23 | End: 2022-12-25

## 2022-12-23 RX ORDER — METRONIDAZOLE 500 MG/100ML
500 INJECTION, SOLUTION INTRAVENOUS ONCE
Status: COMPLETED | OUTPATIENT
Start: 2022-12-23 | End: 2022-12-23

## 2022-12-23 RX ORDER — LEVOTHYROXINE SODIUM 0.03 MG/1
12.5 TABLET ORAL DAILY
Status: DISCONTINUED | OUTPATIENT
Start: 2022-12-23 | End: 2022-12-30 | Stop reason: HOSPADM

## 2022-12-23 RX ORDER — SODIUM CHLORIDE 9 MG/ML
INJECTION, SOLUTION INTRAVENOUS PRN
Status: DISCONTINUED | OUTPATIENT
Start: 2022-12-23 | End: 2022-12-30 | Stop reason: HOSPADM

## 2022-12-23 RX ORDER — ROSUVASTATIN CALCIUM 20 MG/1
40 TABLET, COATED ORAL EVERY MORNING
Status: DISCONTINUED | OUTPATIENT
Start: 2022-12-24 | End: 2022-12-24 | Stop reason: DRUGHIGH

## 2022-12-23 RX ORDER — 0.9 % SODIUM CHLORIDE 0.9 %
1000 INTRAVENOUS SOLUTION INTRAVENOUS ONCE
Status: COMPLETED | OUTPATIENT
Start: 2022-12-23 | End: 2022-12-23

## 2022-12-23 RX ORDER — SODIUM CHLORIDE 0.9 % (FLUSH) 0.9 %
10 SYRINGE (ML) INJECTION PRN
Status: DISCONTINUED | OUTPATIENT
Start: 2022-12-23 | End: 2022-12-30 | Stop reason: HOSPADM

## 2022-12-23 RX ORDER — ONDANSETRON 4 MG/1
4 TABLET, ORALLY DISINTEGRATING ORAL EVERY 8 HOURS PRN
Status: DISCONTINUED | OUTPATIENT
Start: 2022-12-23 | End: 2022-12-30 | Stop reason: HOSPADM

## 2022-12-23 RX ORDER — ACETAMINOPHEN 325 MG/1
650 TABLET ORAL EVERY 6 HOURS PRN
Status: DISCONTINUED | OUTPATIENT
Start: 2022-12-23 | End: 2022-12-30 | Stop reason: HOSPADM

## 2022-12-23 RX ORDER — 0.9 % SODIUM CHLORIDE 0.9 %
30 INTRAVENOUS SOLUTION INTRAVENOUS ONCE
Status: COMPLETED | OUTPATIENT
Start: 2022-12-23 | End: 2022-12-23

## 2022-12-23 RX ORDER — ONDANSETRON 2 MG/ML
4 INJECTION INTRAMUSCULAR; INTRAVENOUS EVERY 6 HOURS PRN
Status: DISCONTINUED | OUTPATIENT
Start: 2022-12-23 | End: 2022-12-30 | Stop reason: HOSPADM

## 2022-12-23 RX ORDER — SODIUM CHLORIDE 0.9 % (FLUSH) 0.9 %
5-40 SYRINGE (ML) INJECTION EVERY 12 HOURS SCHEDULED
Status: DISCONTINUED | OUTPATIENT
Start: 2022-12-23 | End: 2022-12-30 | Stop reason: HOSPADM

## 2022-12-23 RX ORDER — HEPARIN SODIUM 10000 [USP'U]/ML
5000 INJECTION, SOLUTION INTRAVENOUS; SUBCUTANEOUS EVERY 8 HOURS SCHEDULED
Status: DISCONTINUED | OUTPATIENT
Start: 2022-12-23 | End: 2022-12-30 | Stop reason: HOSPADM

## 2022-12-23 RX ADMIN — SODIUM CHLORIDE 2000 ML: 9 INJECTION, SOLUTION INTRAVENOUS at 13:17

## 2022-12-23 RX ADMIN — MORPHINE SULFATE 2 MG: 2 INJECTION, SOLUTION INTRAMUSCULAR; INTRAVENOUS at 21:56

## 2022-12-23 RX ADMIN — METRONIDAZOLE 500 MG: 500 INJECTION, SOLUTION INTRAVENOUS at 15:22

## 2022-12-23 RX ADMIN — SERTRALINE 100 MG: 100 TABLET, FILM COATED ORAL at 18:31

## 2022-12-23 RX ADMIN — SODIUM CHLORIDE: 9 INJECTION, SOLUTION INTRAVENOUS at 20:47

## 2022-12-23 RX ADMIN — HEPARIN SODIUM 5000 UNITS: 10000 INJECTION INTRAVENOUS; SUBCUTANEOUS at 20:47

## 2022-12-23 RX ADMIN — LEVOTHYROXINE SODIUM 12.5 MCG: 25 TABLET ORAL at 18:30

## 2022-12-23 RX ADMIN — SODIUM CHLORIDE 1000 ML: 9 INJECTION, SOLUTION INTRAVENOUS at 12:20

## 2022-12-23 RX ADMIN — METRONIDAZOLE 500 MG: 500 INJECTION, SOLUTION INTRAVENOUS at 18:35

## 2022-12-23 RX ADMIN — ACETAMINOPHEN 650 MG: 325 TABLET ORAL at 18:30

## 2022-12-23 RX ADMIN — WATER 1000 MG: 1 INJECTION INTRAMUSCULAR; INTRAVENOUS; SUBCUTANEOUS at 15:24

## 2022-12-23 RX ADMIN — SODIUM CHLORIDE, PRESERVATIVE FREE 10 ML: 5 INJECTION INTRAVENOUS at 20:44

## 2022-12-23 ASSESSMENT — ENCOUNTER SYMPTOMS
SHORTNESS OF BREATH: 0
BACK PAIN: 0
DIARRHEA: 0
EYE PAIN: 0
SINUS PRESSURE: 0
SORE THROAT: 0
NAUSEA: 0
VOMITING: 0
EYE DISCHARGE: 0
COUGH: 0
WHEEZING: 0
ABDOMINAL PAIN: 1
EYE REDNESS: 0
ABDOMINAL DISTENTION: 0

## 2022-12-23 ASSESSMENT — PAIN - FUNCTIONAL ASSESSMENT
PAIN_FUNCTIONAL_ASSESSMENT: ACTIVITIES ARE NOT PREVENTED
PAIN_FUNCTIONAL_ASSESSMENT: NONE - DENIES PAIN

## 2022-12-23 ASSESSMENT — PAIN DESCRIPTION - DESCRIPTORS
DESCRIPTORS: ACHING;DISCOMFORT;THROBBING
DESCRIPTORS: ACHING

## 2022-12-23 ASSESSMENT — PAIN DESCRIPTION - LOCATION
LOCATION: BACK;ABDOMEN
LOCATION: BACK

## 2022-12-23 ASSESSMENT — PAIN DESCRIPTION - ORIENTATION
ORIENTATION: RIGHT;LEFT
ORIENTATION: MID

## 2022-12-23 ASSESSMENT — PAIN SCALES - GENERAL
PAINLEVEL_OUTOF10: 5
PAINLEVEL_OUTOF10: 5
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
## 2022-12-23 NOTE — PROGRESS NOTES
Patient arrived on unit, she is c/o 5/10 low back pain. She has an NPO order in, patient would like something to eat and drink. I attempted to contact [facility] her VM box is currently full.

## 2022-12-24 LAB
ALBUMIN SERPL-MCNC: 2.9 G/DL (ref 3.5–5.2)
ALP BLD-CCNC: 92 U/L (ref 35–104)
ALT SERPL-CCNC: <5 U/L (ref 0–32)
ANION GAP SERPL CALCULATED.3IONS-SCNC: 14 MMOL/L (ref 7–16)
ANION GAP SERPL CALCULATED.3IONS-SCNC: 15 MMOL/L (ref 7–16)
AST SERPL-CCNC: 14 U/L (ref 0–31)
BASOPHILS ABSOLUTE: 0.05 E9/L (ref 0–0.2)
BASOPHILS RELATIVE PERCENT: 0.6 % (ref 0–2)
BILIRUB SERPL-MCNC: 0.3 MG/DL (ref 0–1.2)
BUN BLDV-MCNC: 35 MG/DL (ref 6–23)
BUN BLDV-MCNC: 37 MG/DL (ref 6–23)
CALCIUM SERPL-MCNC: 8.1 MG/DL (ref 8.6–10.2)
CALCIUM SERPL-MCNC: 8.3 MG/DL (ref 8.6–10.2)
CHLORIDE BLD-SCNC: 109 MMOL/L (ref 98–107)
CHLORIDE BLD-SCNC: 111 MMOL/L (ref 98–107)
CO2: 14 MMOL/L (ref 22–29)
CO2: 17 MMOL/L (ref 22–29)
CREAT SERPL-MCNC: 4.3 MG/DL (ref 0.5–1)
CREAT SERPL-MCNC: 5.4 MG/DL (ref 0.5–1)
EOSINOPHILS ABSOLUTE: 0.16 E9/L (ref 0.05–0.5)
EOSINOPHILS RELATIVE PERCENT: 2 % (ref 0–6)
GFR SERPL CREATININE-BSD FRML MDRD: 10 ML/MIN/1.73
GFR SERPL CREATININE-BSD FRML MDRD: 8 ML/MIN/1.73
GLUCOSE BLD-MCNC: 110 MG/DL (ref 74–99)
GLUCOSE BLD-MCNC: 99 MG/DL (ref 74–99)
HCT VFR BLD CALC: 30.9 % (ref 34–48)
HEMOGLOBIN: 9.9 G/DL (ref 11.5–15.5)
IMMATURE GRANULOCYTES #: 0.04 E9/L
IMMATURE GRANULOCYTES %: 0.5 % (ref 0–5)
LYMPHOCYTES ABSOLUTE: 1.3 E9/L (ref 1.5–4)
LYMPHOCYTES RELATIVE PERCENT: 15.9 % (ref 20–42)
MCH RBC QN AUTO: 28.9 PG (ref 26–35)
MCHC RBC AUTO-ENTMCNC: 32 % (ref 32–34.5)
MCV RBC AUTO: 90.4 FL (ref 80–99.9)
MONOCYTES ABSOLUTE: 0.84 E9/L (ref 0.1–0.95)
MONOCYTES RELATIVE PERCENT: 10.3 % (ref 2–12)
NEUTROPHILS ABSOLUTE: 5.77 E9/L (ref 1.8–7.3)
NEUTROPHILS RELATIVE PERCENT: 70.7 % (ref 43–80)
PDW BLD-RTO: 13.7 FL (ref 11.5–15)
PLATELET # BLD: 175 E9/L (ref 130–450)
PMV BLD AUTO: 9.7 FL (ref 7–12)
POTASSIUM SERPL-SCNC: 4.4 MMOL/L (ref 3.5–5)
POTASSIUM SERPL-SCNC: 4.9 MMOL/L (ref 3.5–5)
RBC # BLD: 3.42 E12/L (ref 3.5–5.5)
SODIUM BLD-SCNC: 140 MMOL/L (ref 132–146)
SODIUM BLD-SCNC: 140 MMOL/L (ref 132–146)
SODIUM URINE: 46 MMOL/L
TOTAL PROTEIN: 5.5 G/DL (ref 6.4–8.3)
UREA NITROGEN, UR: 98 MG/DL (ref 800–1666)
WBC # BLD: 8.2 E9/L (ref 4.5–11.5)

## 2022-12-24 PROCEDURE — 36415 COLL VENOUS BLD VENIPUNCTURE: CPT

## 2022-12-24 PROCEDURE — 80048 BASIC METABOLIC PNL TOTAL CA: CPT

## 2022-12-24 PROCEDURE — 2500000003 HC RX 250 WO HCPCS: Performed by: NURSE PRACTITIONER

## 2022-12-24 PROCEDURE — 2580000003 HC RX 258: Performed by: EMERGENCY MEDICINE

## 2022-12-24 PROCEDURE — 2060000000 HC ICU INTERMEDIATE R&B

## 2022-12-24 PROCEDURE — 80053 COMPREHEN METABOLIC PANEL: CPT

## 2022-12-24 PROCEDURE — 84540 ASSAY OF URINE/UREA-N: CPT

## 2022-12-24 PROCEDURE — 6370000000 HC RX 637 (ALT 250 FOR IP): Performed by: INTERNAL MEDICINE

## 2022-12-24 PROCEDURE — 6370000000 HC RX 637 (ALT 250 FOR IP): Performed by: NURSE PRACTITIONER

## 2022-12-24 PROCEDURE — 6360000002 HC RX W HCPCS: Performed by: NURSE PRACTITIONER

## 2022-12-24 PROCEDURE — 85025 COMPLETE CBC W/AUTO DIFF WBC: CPT

## 2022-12-24 PROCEDURE — 2580000003 HC RX 258: Performed by: INTERNAL MEDICINE

## 2022-12-24 PROCEDURE — 84300 ASSAY OF URINE SODIUM: CPT

## 2022-12-24 PROCEDURE — 2580000003 HC RX 258: Performed by: NURSE PRACTITIONER

## 2022-12-24 RX ORDER — ROSUVASTATIN CALCIUM 10 MG/1
10 TABLET, COATED ORAL EVERY MORNING
Status: DISCONTINUED | OUTPATIENT
Start: 2022-12-24 | End: 2022-12-30 | Stop reason: HOSPADM

## 2022-12-24 RX ORDER — HYDROCODONE BITARTRATE AND ACETAMINOPHEN 5; 325 MG/1; MG/1
1 TABLET ORAL EVERY 6 HOURS PRN
Status: DISCONTINUED | OUTPATIENT
Start: 2022-12-24 | End: 2022-12-25 | Stop reason: SDUPTHER

## 2022-12-24 RX ADMIN — WATER 2000 MG: 1 INJECTION INTRAMUSCULAR; INTRAVENOUS; SUBCUTANEOUS at 09:24

## 2022-12-24 RX ADMIN — HEPARIN SODIUM 5000 UNITS: 10000 INJECTION INTRAVENOUS; SUBCUTANEOUS at 14:57

## 2022-12-24 RX ADMIN — ROSUVASTATIN CALCIUM 10 MG: 10 TABLET, FILM COATED ORAL at 09:20

## 2022-12-24 RX ADMIN — METRONIDAZOLE 500 MG: 500 INJECTION, SOLUTION INTRAVENOUS at 02:44

## 2022-12-24 RX ADMIN — SERTRALINE 100 MG: 100 TABLET, FILM COATED ORAL at 09:20

## 2022-12-24 RX ADMIN — HEPARIN SODIUM 5000 UNITS: 10000 INJECTION INTRAVENOUS; SUBCUTANEOUS at 06:15

## 2022-12-24 RX ADMIN — Medication 10 ML: at 17:25

## 2022-12-24 RX ADMIN — SODIUM CHLORIDE: 9 INJECTION, SOLUTION INTRAVENOUS at 22:33

## 2022-12-24 RX ADMIN — LEVOTHYROXINE SODIUM 12.5 MCG: 25 TABLET ORAL at 06:15

## 2022-12-24 RX ADMIN — METRONIDAZOLE 500 MG: 500 INJECTION, SOLUTION INTRAVENOUS at 17:41

## 2022-12-24 RX ADMIN — SODIUM CHLORIDE, PRESERVATIVE FREE 10 ML: 5 INJECTION INTRAVENOUS at 09:22

## 2022-12-24 RX ADMIN — HYDROCODONE BITARTRATE AND ACETAMINOPHEN 1 TABLET: 5; 325 TABLET ORAL at 22:19

## 2022-12-24 RX ADMIN — METRONIDAZOLE 500 MG: 500 INJECTION, SOLUTION INTRAVENOUS at 09:34

## 2022-12-24 RX ADMIN — HEPARIN SODIUM 5000 UNITS: 10000 INJECTION INTRAVENOUS; SUBCUTANEOUS at 22:06

## 2022-12-24 ASSESSMENT — PAIN DESCRIPTION - LOCATION: LOCATION: HEAD;NECK

## 2022-12-24 ASSESSMENT — PAIN SCALES - GENERAL: PAINLEVEL_OUTOF10: 4

## 2022-12-24 NOTE — PROGRESS NOTES
Patient seems agitated today. She states she does not want to spend another night in the hospital , however she states she is unsure if she feels well enough to go home.
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
## 2022-12-24 NOTE — PROGRESS NOTES
Patient refuses catheter at this time, she is willing to reconsider after 7pm labs are drawn and resulted if creatinine is still elevated.

## 2022-12-24 NOTE — PROGRESS NOTES
Updated Rashel Garcia NP that patient states she is leaving today if she does not see a nephrologist. Order given.

## 2022-12-24 NOTE — CARE COORDINATION
Social Work/Discharge Planning:  Social Work consult noted. Met with patient and completed initial assessment. Explained Social Work role and discussed transition of care/discharge planning. Patient lives with her  and grand-daughter in a one story house. PTA she is independent with no adaptive device. She has a shower chair and walker. She denies any history with hhc or snf. Patient PCP is Dr. Nathalie Singh and she uses [facility] in [facility]. She plans to return at discharge and denies any home care needs at this time. Will continue to follow and assist with discharge planning.   Electronically signed by ROSIE Griggs on 12/24/2022 at 10:27 AM

## 2022-12-24 NOTE — PROGRESS NOTES
This patient is on medication that requires renal, weight, and/or indication dose adjustment. Date Body Weight IBW  Adjusted BW SCr  CrCl Dialysis status   12/24/2022 154 lb 3.2 oz (69.9 kg) Ideal body weight: 47.8 kg (105 lb 6.1 oz)  Adjusted ideal body weight: 56.7 kg (124 lb 14.5 oz) Serum creatinine: 4.3 mg/dL (H) 12/24/22 0354  Estimated creatinine clearance: 11 mL/min (A) N/a       Pharmacy has dose-adjusted the following medication(s):    Date Previous Order Adjusted Order   12/24/2022 Rosuvastatin 40 mg daily Rosuvastatin 10 mg daily       These changes were made per protocol according to the [address] for Pharmacists. *Please note this dose may need readjusted if patient's condition changes. Please contact pharmacy with any questions regarding these changes.     cesar rios, [facility]  12/24/2022  6:32 AM

## 2022-12-25 ENCOUNTER — APPOINTMENT (OUTPATIENT)
Dept: GENERAL RADIOLOGY | Age: 72
End: 2022-12-25
Payer: MEDICARE

## 2022-12-25 LAB
ALBUMIN SERPL-MCNC: 2.3 G/DL (ref 3.5–5.2)
ALP BLD-CCNC: 90 U/L (ref 35–104)
ALT SERPL-CCNC: <5 U/L (ref 0–32)
ANION GAP SERPL CALCULATED.3IONS-SCNC: 11 MMOL/L (ref 7–16)
ANION GAP SERPL CALCULATED.3IONS-SCNC: 13 MMOL/L (ref 7–16)
AST SERPL-CCNC: 13 U/L (ref 0–31)
BASOPHILS ABSOLUTE: 0.03 E9/L (ref 0–0.2)
BASOPHILS RELATIVE PERCENT: 0.6 % (ref 0–2)
BILIRUB SERPL-MCNC: <0.2 MG/DL (ref 0–1.2)
BUN BLDV-MCNC: 31 MG/DL (ref 6–23)
BUN BLDV-MCNC: 36 MG/DL (ref 6–23)
BURR CELLS: ABNORMAL
CALCIUM SERPL-MCNC: 6.3 MG/DL (ref 8.6–10.2)
CALCIUM SERPL-MCNC: 8.1 MG/DL (ref 8.6–10.2)
CHLORIDE BLD-SCNC: 107 MMOL/L (ref 98–107)
CHLORIDE BLD-SCNC: 117 MMOL/L (ref 98–107)
CO2: 12 MMOL/L (ref 22–29)
CO2: 18 MMOL/L (ref 22–29)
CREAT SERPL-MCNC: 4.9 MG/DL (ref 0.5–1)
CREAT SERPL-MCNC: 6.6 MG/DL (ref 0.5–1)
EOSINOPHILS ABSOLUTE: 0.16 E9/L (ref 0.05–0.5)
EOSINOPHILS RELATIVE PERCENT: 3.2 % (ref 0–6)
GFR SERPL CREATININE-BSD FRML MDRD: 6 ML/MIN/1.73
GFR SERPL CREATININE-BSD FRML MDRD: 9 ML/MIN/1.73
GLUCOSE BLD-MCNC: 119 MG/DL (ref 74–99)
GLUCOSE BLD-MCNC: 79 MG/DL (ref 74–99)
HCT VFR BLD CALC: 27.5 % (ref 34–48)
HEMOGLOBIN: 8.7 G/DL (ref 11.5–15.5)
IMMATURE GRANULOCYTES #: 0.02 E9/L
IMMATURE GRANULOCYTES %: 0.4 % (ref 0–5)
LYMPHOCYTES ABSOLUTE: 0.44 E9/L (ref 1.5–4)
LYMPHOCYTES RELATIVE PERCENT: 8.9 % (ref 20–42)
MCH RBC QN AUTO: 29 PG (ref 26–35)
MCHC RBC AUTO-ENTMCNC: 31.6 % (ref 32–34.5)
MCV RBC AUTO: 91.7 FL (ref 80–99.9)
MONOCYTES ABSOLUTE: 0.52 E9/L (ref 0.1–0.95)
MONOCYTES RELATIVE PERCENT: 10.5 % (ref 2–12)
NEUTROPHILS ABSOLUTE: 3.78 E9/L (ref 1.8–7.3)
NEUTROPHILS RELATIVE PERCENT: 76.4 % (ref 43–80)
OVALOCYTES: ABNORMAL
PDW BLD-RTO: 14 FL (ref 11.5–15)
PLATELET # BLD: 164 E9/L (ref 130–450)
PMV BLD AUTO: 9.9 FL (ref 7–12)
POIKILOCYTES: ABNORMAL
POTASSIUM SERPL-SCNC: 3.7 MMOL/L (ref 3.5–5)
POTASSIUM SERPL-SCNC: 4.3 MMOL/L (ref 3.5–5)
RBC # BLD: 3 E12/L (ref 3.5–5.5)
SODIUM BLD-SCNC: 138 MMOL/L (ref 132–146)
SODIUM BLD-SCNC: 140 MMOL/L (ref 132–146)
TOTAL PROTEIN: 4.2 G/DL (ref 6.4–8.3)
TROPONIN, HIGH SENSITIVITY: 17 NG/L (ref 0–9)
TROPONIN, HIGH SENSITIVITY: 21 NG/L (ref 0–9)
WBC # BLD: 5 E9/L (ref 4.5–11.5)

## 2022-12-25 PROCEDURE — 2060000000 HC ICU INTERMEDIATE R&B

## 2022-12-25 PROCEDURE — 84484 ASSAY OF TROPONIN QUANT: CPT

## 2022-12-25 PROCEDURE — 80048 BASIC METABOLIC PNL TOTAL CA: CPT

## 2022-12-25 PROCEDURE — 2580000003 HC RX 258: Performed by: INTERNAL MEDICINE

## 2022-12-25 PROCEDURE — 6370000000 HC RX 637 (ALT 250 FOR IP): Performed by: INTERNAL MEDICINE

## 2022-12-25 PROCEDURE — 51702 INSERT TEMP BLADDER CATH: CPT

## 2022-12-25 PROCEDURE — 2500000003 HC RX 250 WO HCPCS: Performed by: INTERNAL MEDICINE

## 2022-12-25 PROCEDURE — 6360000002 HC RX W HCPCS: Performed by: NURSE PRACTITIONER

## 2022-12-25 PROCEDURE — 71045 X-RAY EXAM CHEST 1 VIEW: CPT

## 2022-12-25 PROCEDURE — 80053 COMPREHEN METABOLIC PANEL: CPT

## 2022-12-25 PROCEDURE — 93005 ELECTROCARDIOGRAM TRACING: CPT | Performed by: INTERNAL MEDICINE

## 2022-12-25 PROCEDURE — 2580000003 HC RX 258: Performed by: NURSE PRACTITIONER

## 2022-12-25 PROCEDURE — 6370000000 HC RX 637 (ALT 250 FOR IP): Performed by: NURSE PRACTITIONER

## 2022-12-25 PROCEDURE — 36415 COLL VENOUS BLD VENIPUNCTURE: CPT

## 2022-12-25 PROCEDURE — 85025 COMPLETE CBC W/AUTO DIFF WBC: CPT

## 2022-12-25 PROCEDURE — 2500000003 HC RX 250 WO HCPCS: Performed by: NURSE PRACTITIONER

## 2022-12-25 RX ORDER — GUAIFENESIN/DEXTROMETHORPHAN 100-10MG/5
5 SYRUP ORAL EVERY 4 HOURS PRN
Status: DISCONTINUED | OUTPATIENT
Start: 2022-12-25 | End: 2022-12-30 | Stop reason: HOSPADM

## 2022-12-25 RX ORDER — HYDROCODONE BITARTRATE AND ACETAMINOPHEN 5; 325 MG/1; MG/1
1 TABLET ORAL EVERY 6 HOURS PRN
Status: DISCONTINUED | OUTPATIENT
Start: 2022-12-25 | End: 2022-12-30 | Stop reason: HOSPADM

## 2022-12-25 RX ADMIN — HEPARIN SODIUM 5000 UNITS: 10000 INJECTION INTRAVENOUS; SUBCUTANEOUS at 06:31

## 2022-12-25 RX ADMIN — SERTRALINE 100 MG: 100 TABLET, FILM COATED ORAL at 08:32

## 2022-12-25 RX ADMIN — METRONIDAZOLE 500 MG: 500 INJECTION, SOLUTION INTRAVENOUS at 18:02

## 2022-12-25 RX ADMIN — GUAIFENESIN AND DEXTROMETHORPHAN 5 ML: 100; 10 SYRUP ORAL at 09:54

## 2022-12-25 RX ADMIN — METRONIDAZOLE 500 MG: 500 INJECTION, SOLUTION INTRAVENOUS at 09:57

## 2022-12-25 RX ADMIN — ROSUVASTATIN CALCIUM 10 MG: 10 TABLET, FILM COATED ORAL at 08:32

## 2022-12-25 RX ADMIN — LEVOTHYROXINE SODIUM 12.5 MCG: 25 TABLET ORAL at 06:30

## 2022-12-25 RX ADMIN — GUAIFENESIN AND DEXTROMETHORPHAN 5 ML: 100; 10 SYRUP ORAL at 18:10

## 2022-12-25 RX ADMIN — HYDROCODONE BITARTRATE AND ACETAMINOPHEN 1 TABLET: 5; 325 TABLET ORAL at 18:05

## 2022-12-25 RX ADMIN — SODIUM BICARBONATE: 84 INJECTION, SOLUTION INTRAVENOUS at 09:54

## 2022-12-25 RX ADMIN — METRONIDAZOLE 500 MG: 500 INJECTION, SOLUTION INTRAVENOUS at 03:01

## 2022-12-25 RX ADMIN — HEPARIN SODIUM 5000 UNITS: 10000 INJECTION INTRAVENOUS; SUBCUTANEOUS at 13:32

## 2022-12-25 RX ADMIN — HEPARIN SODIUM 5000 UNITS: 10000 INJECTION INTRAVENOUS; SUBCUTANEOUS at 21:09

## 2022-12-25 RX ADMIN — WATER 2000 MG: 1 INJECTION INTRAMUSCULAR; INTRAVENOUS; SUBCUTANEOUS at 08:31

## 2022-12-25 ASSESSMENT — PAIN DESCRIPTION - ONSET: ONSET: ON-GOING

## 2022-12-25 ASSESSMENT — PAIN DESCRIPTION - ORIENTATION: ORIENTATION: RIGHT;LEFT

## 2022-12-25 ASSESSMENT — PAIN SCALES - GENERAL
PAINLEVEL_OUTOF10: 0
PAINLEVEL_OUTOF10: 6
PAINLEVEL_OUTOF10: 0

## 2022-12-25 ASSESSMENT — PAIN DESCRIPTION - PAIN TYPE: TYPE: ACUTE PAIN

## 2022-12-25 ASSESSMENT — PAIN DESCRIPTION - DESCRIPTORS: DESCRIPTORS: ACHING;DISCOMFORT;DULL

## 2022-12-25 ASSESSMENT — PAIN DESCRIPTION - FREQUENCY: FREQUENCY: INTERMITTENT

## 2022-12-25 ASSESSMENT — PAIN DESCRIPTION - LOCATION: LOCATION: ARM;WRIST

## 2022-12-25 NOTE — PLAN OF CARE
Problem: Discharge Planning  Goal: Discharge to home or other facility with appropriate resources  12/25/2022 0043 by Apple Maloney RN  Outcome: Progressing  12/24/2022 1322 by Janina Coffey RN  Outcome: Progressing
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
## 2022-12-25 NOTE — PROGRESS NOTES
Dr. Mann Rockwell updated of afternoon bmp results via perfect serve. Orders received. Miles catheter placed with yellow urine immediately returned.
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
## 2022-12-26 ENCOUNTER — APPOINTMENT (OUTPATIENT)
Dept: GENERAL RADIOLOGY | Age: 72
End: 2022-12-26
Payer: MEDICARE

## 2022-12-26 LAB
ANION GAP SERPL CALCULATED.3IONS-SCNC: 11 MMOL/L (ref 7–16)
ANION GAP SERPL CALCULATED.3IONS-SCNC: 15 MMOL/L (ref 7–16)
ANISOCYTOSIS: ABNORMAL
BASOPHILS ABSOLUTE: 0.01 E9/L (ref 0–0.2)
BASOPHILS RELATIVE PERCENT: 0.2 % (ref 0–2)
BUN BLDV-MCNC: 33 MG/DL (ref 6–23)
BUN BLDV-MCNC: 34 MG/DL (ref 6–23)
CALCIUM SERPL-MCNC: 7.7 MG/DL (ref 8.6–10.2)
CALCIUM SERPL-MCNC: 7.9 MG/DL (ref 8.6–10.2)
CHLORIDE BLD-SCNC: 103 MMOL/L (ref 98–107)
CHLORIDE BLD-SCNC: 103 MMOL/L (ref 98–107)
CO2: 21 MMOL/L (ref 22–29)
CO2: 26 MMOL/L (ref 22–29)
CREAT SERPL-MCNC: 6.9 MG/DL (ref 0.5–1)
CREAT SERPL-MCNC: 7.1 MG/DL (ref 0.5–1)
EOSINOPHILS ABSOLUTE: 0.14 E9/L (ref 0.05–0.5)
EOSINOPHILS RELATIVE PERCENT: 3.2 % (ref 0–6)
GFR SERPL CREATININE-BSD FRML MDRD: 6 ML/MIN/1.73
GFR SERPL CREATININE-BSD FRML MDRD: 6 ML/MIN/1.73
GLUCOSE BLD-MCNC: 110 MG/DL (ref 74–99)
GLUCOSE BLD-MCNC: 143 MG/DL (ref 74–99)
HCT VFR BLD CALC: 29.4 % (ref 34–48)
HEMOGLOBIN: 9.4 G/DL (ref 11.5–15.5)
IMMATURE GRANULOCYTES #: 0.01 E9/L
IMMATURE GRANULOCYTES %: 0.2 % (ref 0–5)
LYMPHOCYTES ABSOLUTE: 0.53 E9/L (ref 1.5–4)
LYMPHOCYTES RELATIVE PERCENT: 12.2 % (ref 20–42)
MCH RBC QN AUTO: 28.4 PG (ref 26–35)
MCHC RBC AUTO-ENTMCNC: 32 % (ref 32–34.5)
MCV RBC AUTO: 88.8 FL (ref 80–99.9)
MONOCYTES ABSOLUTE: 0.6 E9/L (ref 0.1–0.95)
MONOCYTES RELATIVE PERCENT: 13.8 % (ref 2–12)
NEUTROPHILS ABSOLUTE: 3.05 E9/L (ref 1.8–7.3)
NEUTROPHILS RELATIVE PERCENT: 70.4 % (ref 43–80)
OVALOCYTES: ABNORMAL
PDW BLD-RTO: 14.3 FL (ref 11.5–15)
PLATELET # BLD: 186 E9/L (ref 130–450)
PMV BLD AUTO: 10.3 FL (ref 7–12)
POIKILOCYTES: ABNORMAL
POTASSIUM SERPL-SCNC: 3.9 MMOL/L (ref 3.5–5)
POTASSIUM SERPL-SCNC: 4.1 MMOL/L (ref 3.5–5)
RBC # BLD: 3.31 E12/L (ref 3.5–5.5)
REASON FOR REJECTION: NORMAL
REJECTED TEST: NORMAL
SODIUM BLD-SCNC: 139 MMOL/L (ref 132–146)
SODIUM BLD-SCNC: 140 MMOL/L (ref 132–146)
WBC # BLD: 4.3 E9/L (ref 4.5–11.5)

## 2022-12-26 PROCEDURE — 6370000000 HC RX 637 (ALT 250 FOR IP): Performed by: NURSE PRACTITIONER

## 2022-12-26 PROCEDURE — 99222 1ST HOSP IP/OBS MODERATE 55: CPT | Performed by: INTERNAL MEDICINE

## 2022-12-26 PROCEDURE — 2580000003 HC RX 258: Performed by: NURSE PRACTITIONER

## 2022-12-26 PROCEDURE — 2060000000 HC ICU INTERMEDIATE R&B

## 2022-12-26 PROCEDURE — 36415 COLL VENOUS BLD VENIPUNCTURE: CPT

## 2022-12-26 PROCEDURE — 6360000002 HC RX W HCPCS: Performed by: NURSE PRACTITIONER

## 2022-12-26 PROCEDURE — 80048 BASIC METABOLIC PNL TOTAL CA: CPT

## 2022-12-26 PROCEDURE — 73090 X-RAY EXAM OF FOREARM: CPT

## 2022-12-26 PROCEDURE — 73060 X-RAY EXAM OF HUMERUS: CPT

## 2022-12-26 PROCEDURE — 2500000003 HC RX 250 WO HCPCS: Performed by: NURSE PRACTITIONER

## 2022-12-26 PROCEDURE — 85025 COMPLETE CBC W/AUTO DIFF WBC: CPT

## 2022-12-26 PROCEDURE — 6370000000 HC RX 637 (ALT 250 FOR IP): Performed by: INTERNAL MEDICINE

## 2022-12-26 PROCEDURE — 2580000003 HC RX 258: Performed by: INTERNAL MEDICINE

## 2022-12-26 RX ORDER — SODIUM CHLORIDE 9 MG/ML
INJECTION, SOLUTION INTRAVENOUS CONTINUOUS
Status: DISCONTINUED | OUTPATIENT
Start: 2022-12-26 | End: 2022-12-27

## 2022-12-26 RX ADMIN — SODIUM CHLORIDE: 9 INJECTION, SOLUTION INTRAVENOUS at 11:01

## 2022-12-26 RX ADMIN — HEPARIN SODIUM 5000 UNITS: 10000 INJECTION INTRAVENOUS; SUBCUTANEOUS at 22:29

## 2022-12-26 RX ADMIN — ACETAMINOPHEN 650 MG: 325 TABLET ORAL at 14:33

## 2022-12-26 RX ADMIN — SERTRALINE 100 MG: 100 TABLET, FILM COATED ORAL at 07:37

## 2022-12-26 RX ADMIN — HYDROCODONE BITARTRATE AND ACETAMINOPHEN 1 TABLET: 5; 325 TABLET ORAL at 22:25

## 2022-12-26 RX ADMIN — METRONIDAZOLE 500 MG: 500 INJECTION, SOLUTION INTRAVENOUS at 17:52

## 2022-12-26 RX ADMIN — METRONIDAZOLE 500 MG: 500 INJECTION, SOLUTION INTRAVENOUS at 02:28

## 2022-12-26 RX ADMIN — LEVOTHYROXINE SODIUM 12.5 MCG: 25 TABLET ORAL at 05:53

## 2022-12-26 RX ADMIN — METRONIDAZOLE 500 MG: 500 INJECTION, SOLUTION INTRAVENOUS at 11:00

## 2022-12-26 RX ADMIN — HEPARIN SODIUM 5000 UNITS: 10000 INJECTION INTRAVENOUS; SUBCUTANEOUS at 14:39

## 2022-12-26 RX ADMIN — GUAIFENESIN AND DEXTROMETHORPHAN 5 ML: 100; 10 SYRUP ORAL at 22:32

## 2022-12-26 RX ADMIN — WATER 2000 MG: 1 INJECTION INTRAMUSCULAR; INTRAVENOUS; SUBCUTANEOUS at 07:36

## 2022-12-26 RX ADMIN — HYDROCODONE BITARTRATE AND ACETAMINOPHEN 1 TABLET: 5; 325 TABLET ORAL at 02:26

## 2022-12-26 RX ADMIN — Medication 10 ML: at 09:48

## 2022-12-26 RX ADMIN — SODIUM CHLORIDE: 9 INJECTION, SOLUTION INTRAVENOUS at 23:47

## 2022-12-26 RX ADMIN — SODIUM CHLORIDE, PRESERVATIVE FREE 10 ML: 5 INJECTION INTRAVENOUS at 07:37

## 2022-12-26 RX ADMIN — ROSUVASTATIN CALCIUM 10 MG: 10 TABLET, FILM COATED ORAL at 07:37

## 2022-12-26 RX ADMIN — SODIUM CHLORIDE, PRESERVATIVE FREE 10 ML: 5 INJECTION INTRAVENOUS at 22:24

## 2022-12-26 RX ADMIN — HEPARIN SODIUM 5000 UNITS: 10000 INJECTION INTRAVENOUS; SUBCUTANEOUS at 05:53

## 2022-12-26 ASSESSMENT — PAIN DESCRIPTION - LOCATION
LOCATION: ARM
LOCATION: WRIST;BACK
LOCATION: ARM

## 2022-12-26 ASSESSMENT — PAIN DESCRIPTION - ORIENTATION
ORIENTATION: RIGHT;LOWER
ORIENTATION: RIGHT
ORIENTATION: LEFT

## 2022-12-26 ASSESSMENT — PAIN SCALES - GENERAL
PAINLEVEL_OUTOF10: 8
PAINLEVEL_OUTOF10: 6
PAINLEVEL_OUTOF10: 8
PAINLEVEL_OUTOF10: 0
PAINLEVEL_OUTOF10: 6
PAINLEVEL_OUTOF10: 3

## 2022-12-26 ASSESSMENT — PAIN DESCRIPTION - DESCRIPTORS
DESCRIPTORS: ACHING;DISCOMFORT;SORE
DESCRIPTORS: ACHING

## 2022-12-26 ASSESSMENT — PAIN - FUNCTIONAL ASSESSMENT
PAIN_FUNCTIONAL_ASSESSMENT: ACTIVITIES ARE NOT PREVENTED
PAIN_FUNCTIONAL_ASSESSMENT: PREVENTS OR INTERFERES WITH ALL ACTIVE AND SOME PASSIVE ACTIVITIES

## 2022-12-26 ASSESSMENT — PAIN DESCRIPTION - PAIN TYPE: TYPE: ACUTE PAIN

## 2022-12-26 NOTE — PROGRESS NOTES
Patient states left arm IV and her entire arm are hurting 8/10. Patient declining pain medication at this time. IV removed, stat IV team consulted for labs and IV access.
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
## 2022-12-26 NOTE — PLAN OF CARE
Problem: Discharge Planning  Goal: Discharge to home or other facility with appropriate resources  Outcome: Progressing     Problem: Safety - Adult  Goal: Free from fall injury  12/25/2022 1954 by Jamila Griffith RN  Outcome: Progressing     Problem: Pain  Goal: Verbalizes/displays adequate comfort level or baseline comfort level  12/25/2022 1954 by Jamila Griffith RN  Outcome: Progressing

## 2022-12-27 ENCOUNTER — APPOINTMENT (OUTPATIENT)
Dept: NUCLEAR MEDICINE | Age: 72
End: 2022-12-27
Payer: MEDICARE

## 2022-12-27 ENCOUNTER — APPOINTMENT (OUTPATIENT)
Dept: NON INVASIVE DIAGNOSTICS | Age: 72
End: 2022-12-27
Payer: MEDICARE

## 2022-12-27 PROBLEM — R77.8 ELEVATED TROPONIN: Status: ACTIVE | Noted: 2022-12-27

## 2022-12-27 PROBLEM — R07.89 ATYPICAL CHEST PAIN: Status: ACTIVE | Noted: 2022-12-27

## 2022-12-27 PROBLEM — K57.32 DIVERTICULITIS OF COLON: Status: ACTIVE | Noted: 2022-12-27

## 2022-12-27 PROBLEM — R79.89 ELEVATED TROPONIN: Status: ACTIVE | Noted: 2022-12-27

## 2022-12-27 LAB
ANION GAP SERPL CALCULATED.3IONS-SCNC: 14 MMOL/L (ref 7–16)
BACTERIA: NORMAL /HPF
BILIRUBIN URINE: NEGATIVE
BLOOD, URINE: ABNORMAL
BUN BLDV-MCNC: 31 MG/DL (ref 6–23)
CALCIUM SERPL-MCNC: 7.4 MG/DL (ref 8.6–10.2)
CHLORIDE BLD-SCNC: 108 MMOL/L (ref 98–107)
CLARITY: CLEAR
CO2: 20 MMOL/L (ref 22–29)
COLOR: YELLOW
CREAT SERPL-MCNC: 6.8 MG/DL (ref 0.5–1)
CREATININE URINE: 59 MG/DL (ref 29–226)
EKG ATRIAL RATE: 79 BPM
EKG P AXIS: 50 DEGREES
EKG P-R INTERVAL: 136 MS
EKG Q-T INTERVAL: 362 MS
EKG QRS DURATION: 66 MS
EKG QTC CALCULATION (BAZETT): 415 MS
EKG R AXIS: 15 DEGREES
EKG T AXIS: 36 DEGREES
EKG VENTRICULAR RATE: 79 BPM
EPITHELIAL CELLS, UA: NORMAL /HPF
GFR SERPL CREATININE-BSD FRML MDRD: 6 ML/MIN/1.73
GLUCOSE BLD-MCNC: 95 MG/DL (ref 74–99)
GLUCOSE URINE: NEGATIVE MG/DL
HCT VFR BLD CALC: 29 % (ref 34–48)
HEMOGLOBIN: 9.3 G/DL (ref 11.5–15.5)
KETONES, URINE: NEGATIVE MG/DL
LEUKOCYTE ESTERASE, URINE: ABNORMAL
MCH RBC QN AUTO: 28.7 PG (ref 26–35)
MCHC RBC AUTO-ENTMCNC: 32.1 % (ref 32–34.5)
MCV RBC AUTO: 89.5 FL (ref 80–99.9)
NITRITE, URINE: NEGATIVE
PDW BLD-RTO: 14.5 FL (ref 11.5–15)
PH UA: 6 (ref 5–9)
PLATELET # BLD: 171 E9/L (ref 130–450)
PMV BLD AUTO: 10.3 FL (ref 7–12)
POTASSIUM SERPL-SCNC: 3.7 MMOL/L (ref 3.5–5)
PROTEIN PROTEIN: 34 MG/DL (ref 0–12)
PROTEIN UA: ABNORMAL MG/DL
PROTEIN/CREAT RATIO: 0.6
PROTEIN/CREAT RATIO: 0.6 (ref 0–0.2)
RBC # BLD: 3.24 E12/L (ref 3.5–5.5)
RBC UA: NORMAL /HPF (ref 0–2)
SODIUM BLD-SCNC: 142 MMOL/L (ref 132–146)
SPECIFIC GRAVITY UA: 1.01 (ref 1–1.03)
UROBILINOGEN, URINE: 0.2 E.U./DL
WBC # BLD: 3.5 E9/L (ref 4.5–11.5)
WBC UA: NORMAL /HPF (ref 0–5)

## 2022-12-27 PROCEDURE — 78452 HT MUSCLE IMAGE SPECT MULT: CPT | Performed by: INTERNAL MEDICINE

## 2022-12-27 PROCEDURE — 99232 SBSQ HOSP IP/OBS MODERATE 35: CPT | Performed by: INTERNAL MEDICINE

## 2022-12-27 PROCEDURE — 2580000003 HC RX 258: Performed by: NURSE PRACTITIONER

## 2022-12-27 PROCEDURE — 6360000002 HC RX W HCPCS: Performed by: NURSE PRACTITIONER

## 2022-12-27 PROCEDURE — 36415 COLL VENOUS BLD VENIPUNCTURE: CPT

## 2022-12-27 PROCEDURE — 97165 OT EVAL LOW COMPLEX 30 MIN: CPT

## 2022-12-27 PROCEDURE — 84156 ASSAY OF PROTEIN URINE: CPT

## 2022-12-27 PROCEDURE — 85027 COMPLETE CBC AUTOMATED: CPT

## 2022-12-27 PROCEDURE — 78452 HT MUSCLE IMAGE SPECT MULT: CPT

## 2022-12-27 PROCEDURE — 2500000003 HC RX 250 WO HCPCS: Performed by: NURSE PRACTITIONER

## 2022-12-27 PROCEDURE — 6370000000 HC RX 637 (ALT 250 FOR IP): Performed by: NURSE PRACTITIONER

## 2022-12-27 PROCEDURE — 93018 CV STRESS TEST I&R ONLY: CPT | Performed by: INTERNAL MEDICINE

## 2022-12-27 PROCEDURE — 6370000000 HC RX 637 (ALT 250 FOR IP): Performed by: INTERNAL MEDICINE

## 2022-12-27 PROCEDURE — 3430000000 HC RX DIAGNOSTIC RADIOPHARMACEUTICAL: Performed by: RADIOLOGY

## 2022-12-27 PROCEDURE — 80048 BASIC METABOLIC PNL TOTAL CA: CPT

## 2022-12-27 PROCEDURE — 93016 CV STRESS TEST SUPVJ ONLY: CPT | Performed by: INTERNAL MEDICINE

## 2022-12-27 PROCEDURE — 93017 CV STRESS TEST TRACING ONLY: CPT

## 2022-12-27 PROCEDURE — 2580000003 HC RX 258: Performed by: INTERNAL MEDICINE

## 2022-12-27 PROCEDURE — 93010 ELECTROCARDIOGRAM REPORT: CPT | Performed by: INTERNAL MEDICINE

## 2022-12-27 PROCEDURE — 81001 URINALYSIS AUTO W/SCOPE: CPT

## 2022-12-27 PROCEDURE — 2060000000 HC ICU INTERMEDIATE R&B

## 2022-12-27 PROCEDURE — 6360000002 HC RX W HCPCS: Performed by: INTERNAL MEDICINE

## 2022-12-27 PROCEDURE — 82570 ASSAY OF URINE CREATININE: CPT

## 2022-12-27 PROCEDURE — A9500 TC99M SESTAMIBI: HCPCS | Performed by: RADIOLOGY

## 2022-12-27 RX ORDER — TECHNETIUM TC-99M SESTAMIBI 1 MG/10ML
10 INJECTION INTRAVENOUS
Status: COMPLETED | OUTPATIENT
Start: 2022-12-27 | End: 2022-12-27

## 2022-12-27 RX ORDER — TECHNETIUM TC-99M SESTAMIBI 1 MG/10ML
30 INJECTION INTRAVENOUS
Status: COMPLETED | OUTPATIENT
Start: 2022-12-27 | End: 2022-12-27

## 2022-12-27 RX ORDER — SODIUM CHLORIDE, SODIUM LACTATE, POTASSIUM CHLORIDE, CALCIUM CHLORIDE 600; 310; 30; 20 MG/100ML; MG/100ML; MG/100ML; MG/100ML
INJECTION, SOLUTION INTRAVENOUS CONTINUOUS
Status: DISCONTINUED | OUTPATIENT
Start: 2022-12-27 | End: 2022-12-28

## 2022-12-27 RX ADMIN — SODIUM CHLORIDE, POTASSIUM CHLORIDE, SODIUM LACTATE AND CALCIUM CHLORIDE: 600; 310; 30; 20 INJECTION, SOLUTION INTRAVENOUS at 16:08

## 2022-12-27 RX ADMIN — REGADENOSON 0.4 MG: 0.08 INJECTION, SOLUTION INTRAVENOUS at 10:20

## 2022-12-27 RX ADMIN — METRONIDAZOLE 500 MG: 500 INJECTION, SOLUTION INTRAVENOUS at 18:30

## 2022-12-27 RX ADMIN — WATER 2000 MG: 1 INJECTION INTRAMUSCULAR; INTRAVENOUS; SUBCUTANEOUS at 08:47

## 2022-12-27 RX ADMIN — LEVOTHYROXINE SODIUM 12.5 MCG: 25 TABLET ORAL at 05:47

## 2022-12-27 RX ADMIN — SERTRALINE 100 MG: 100 TABLET, FILM COATED ORAL at 08:47

## 2022-12-27 RX ADMIN — HYDROCODONE BITARTRATE AND ACETAMINOPHEN 1 TABLET: 5; 325 TABLET ORAL at 19:19

## 2022-12-27 RX ADMIN — Medication 30 MILLICURIE: at 10:00

## 2022-12-27 RX ADMIN — HEPARIN SODIUM 5000 UNITS: 10000 INJECTION INTRAVENOUS; SUBCUTANEOUS at 14:18

## 2022-12-27 RX ADMIN — METRONIDAZOLE 500 MG: 500 INJECTION, SOLUTION INTRAVENOUS at 12:06

## 2022-12-27 RX ADMIN — SODIUM CHLORIDE, PRESERVATIVE FREE 10 ML: 5 INJECTION INTRAVENOUS at 08:48

## 2022-12-27 RX ADMIN — ROPINIROLE HYDROCHLORIDE 1 MG: 1 TABLET, FILM COATED ORAL at 21:02

## 2022-12-27 RX ADMIN — METRONIDAZOLE 500 MG: 500 INJECTION, SOLUTION INTRAVENOUS at 02:51

## 2022-12-27 RX ADMIN — SODIUM CHLORIDE: 9 INJECTION, SOLUTION INTRAVENOUS at 08:47

## 2022-12-27 RX ADMIN — HEPARIN SODIUM 5000 UNITS: 10000 INJECTION INTRAVENOUS; SUBCUTANEOUS at 05:49

## 2022-12-27 RX ADMIN — HYDROCODONE BITARTRATE AND ACETAMINOPHEN 1 TABLET: 5; 325 TABLET ORAL at 05:46

## 2022-12-27 RX ADMIN — GUAIFENESIN AND DEXTROMETHORPHAN 5 ML: 100; 10 SYRUP ORAL at 02:53

## 2022-12-27 RX ADMIN — HYDROCODONE BITARTRATE AND ACETAMINOPHEN 1 TABLET: 5; 325 TABLET ORAL at 14:47

## 2022-12-27 RX ADMIN — HEPARIN SODIUM 5000 UNITS: 10000 INJECTION INTRAVENOUS; SUBCUTANEOUS at 21:03

## 2022-12-27 RX ADMIN — ROSUVASTATIN CALCIUM 10 MG: 10 TABLET, FILM COATED ORAL at 08:47

## 2022-12-27 RX ADMIN — Medication 10 MILLICURIE: at 09:04

## 2022-12-27 ASSESSMENT — PAIN SCALES - GENERAL
PAINLEVEL_OUTOF10: 9
PAINLEVEL_OUTOF10: 6
PAINLEVEL_OUTOF10: 1
PAINLEVEL_OUTOF10: 9
PAINLEVEL_OUTOF10: 3

## 2022-12-27 ASSESSMENT — PAIN DESCRIPTION - LOCATION
LOCATION: LEG;BACK
LOCATION: ARM
LOCATION: ARM

## 2022-12-27 ASSESSMENT — PAIN DESCRIPTION - ORIENTATION
ORIENTATION: RIGHT;LEFT
ORIENTATION: RIGHT
ORIENTATION: LEFT

## 2022-12-27 ASSESSMENT — PAIN DESCRIPTION - DESCRIPTORS
DESCRIPTORS: ACHING
DESCRIPTORS: ACHING
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
## 2022-12-27 NOTE — PROGRESS NOTES
Occupational Therapy  OCCUPATIONAL THERAPY INITIAL EVALUATION     [address] [place], [place]        VEOA:                                                  Patient Name: Osman Gonzales    MRN: [id number]    : 1950    Room: [address] [place] [address]      Evaluating OT: Vickie Paz OTR/L [id number]      Referring Provider: KOLBY Lund CNP    Specific Provider Orders/Date: OT eval and treat 22      Diagnosis:  Dehydration [E86.0]  ARF (acute renal failure) ([facility] [address] [N17.9]  Diverticulitis of colon [K57.32]  Acute renal failure, unspecified acute renal failure type ([facility] [address] [N17.9]      Pertinent Medical History: HTN, OCD, R wrist ORIF with irrigation and debridement 12/10/22      Precautions:  Fall Risk, NWB RUE     Assessment of current deficits    [x] Functional mobility  [x]ADLs  [x] Strength               []Cognition    [x] Functional transfers   [x] IADLs         [x] Safety Awareness   [x]Endurance    [] Fine Coordination              [x] Balance      [] Vision/perception   []Sensation     []Gross Motor Coordination  [] ROM  [] Delirium                   [] Motor Control     OT PLAN OF CARE   OT POC based on physician orders, patient diagnosis and results of clinical assessment    Frequency/Duration 2-4 days/wk for 2 weeks PRN   Specific OT Treatment Interventions to include:   * Instruction/training on adapted ADL techniques and AE recommendations to increase functional independence within precautions       * Training on energy conservation strategies, correct breathing pattern and techniques to improve independence/tolerance for self-care routine  * Functional transfer/mobility training/DME recommendations for increased independence, safety, and fall prevention  * Patient/Family education to increase follow through with safety techniques and functional independence  * Recommendation of environmental modifications for increased safety with functional transfers/mobility and ADLs  * Therapeutic exercise to improve motor endurance, ROM, and functional strength for ADLs/functional transfers  * Therapeutic activities to facilitate/challenge dynamic balance, stand tolerance for increased safety and independence with ADLs  * Positioning to improve skin integrity, interaction with environment and functional independence        Recommended Adaptive Equipment: TBD     Home Living: Pt lives with  and granddaughter in 1 story house with 2 GAL and 1 hand rail. Bathroom setup: tub/shower with shower chair   Equipment owned: wheeled walker    Prior Level of Function: independent with ADLs , assist from  with IADLs; functional mobility: no device    Pain Level: pt did not report pain this date; pt agreeable to therapy  Cognition: A&O: pt alert, conversing, and following commands.    Memory:  WFL   Sequencing:  WFL   Problem solving:  WFL   Judgement/safety:  WFL     Functional Assessment:  AM-PAC Daily Activity Raw Score: 18/24   Initial Eval Status  Date: 12/27/22 Treatment Status  Date: STGs = LTGs  Time frame: 10-14 days   Feeding Set up     Grooming Min A  Mod I/I   UB Dressing Min A  For gown management   Mod I/I   LB Dressing Min A  To don socks    Mod I/I-with use of AD as appropriate/needed   Bathing Min A  Mod I/I -with use of AD as appropriate/needed   Toileting Min A  Mod I/I    Bed Mobility  Supine to sit: Sup    Independent   Functional Transfers SBA with no device  Sit<>stand from EOB  Stand to sit to chair  Independent    Functional Mobility SBA with no device  Short distance in room  Assist to manage lines  Independent -with device as needed to maximize independence with ADLs and functional task completion   Balance Sitting:     Static:  Sup    Dynamic:SBA  Standing: SBA  I for static/dynamic sitting balance to maximize independence with ADLs and functional task completion    I for standing balance to maximize independence with ADLs and functional task completion   Activity Tolerance Fair+ with light activity  Good with ADL activity. Pt will demonstrate good understanding of education provided on EC/WS techniques    Visual/  Perceptual Glasses: none at bedside                Additional long-term goal: Pt will increase functional independence to PLOF to allow pt to live in least restrictive environment. Hand Dominance R   AROM (PROM) Strength Additional Info:    RUE  Shoulder, elbow, digits: WFL Deferred      LUE WFL WFL good  and wfl FMC/dexterity noted during ADL tasks     Hearing: WFL   Sensation:  No c/o numbness or tingling   Tone: WFL   Edema: RUE    Comments: Upon arrival patient lying in bed. At end of session, patient sitting in chair with call light and phone within reach, all lines and tubes intact, and alarm set. Overall patient demonstrated decreased independence and safety during completion of ADL/functional transfer/mobility tasks. Pt would benefit from continued skilled OT to increase safety and independence with completion of ADL/IADL tasks for functional independence and quality of life. Rehab Potential: Good for established goals     Patient / Family Goal: to return home    Patient and/or family were instructed on functional diagnosis, prognosis/goals and OT plan of care. Demonstrated good understanding.      Eval Complexity: Low    Time In: 1343  Time Out: 1400    Min Units   OT Eval Low 97165  x  1   OT Eval Medium 55069      OT Eval High 47304      OT Re-Eval [id number]       Therapeutic Ex 25628       Therapeutic Activities 00492       ADL/Self Care 15603       Orthotic Management 01197       Manual [id number]     Neuro Re-Ed 76479       Non-Billable Time          Evaluation Time additionally includes thorough review of current medical information, gathering information on past medical history/social history and prior level of function, interpretation of standardized testing/informal observation of tasks, assessment of data and development of plan of care and goals.             Alfredo Rollins, OTR/L, [id number]

## 2022-12-27 NOTE — PROCEDURES
Pharmacologic Nuclear Stress Test    Date: 12/27/2022    Indication: Chest pain, elevated troponin    Description of procedure:    Protocol: Regadenoson stress SPECT myocardial perfusion imaging    Baseline EKG: NSR 69 bpm. Normal axis/intervals. No ST/T changes. Baseline BP: 126/65 mmHg    0.4 mg of regadenoson was injected followed by radiotracer injection. Patient reported moderate chest pain that was present prior to initiation of the protocol. Also became nauseous and had emesis. Stress EKG showed no evidence of ischemia, and no arrhythmias were noted. Impression:  No evidence of regadenoson induced ischemia on stress EKG. Nuclear images to be reported separately.     Donato Rios MD, [address] [facility] Cardiology
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
## 2022-12-27 NOTE — PLAN OF CARE
Problem: Discharge Planning  Goal: Discharge to home or other facility with appropriate resources  12/27/2022 1349 by Tamar Ho RN  Outcome: Progressing  12/27/2022 0452 by Kenji Zavala RN  Outcome: Progressing     Problem: Safety - Adult  Goal: Free from fall injury  12/27/2022 1349 by Tamar Ho RN  Outcome: Progressing  12/27/2022 0452 by Kenji Zavala RN  Outcome: Progressing     Problem: Pain  Goal: Verbalizes/displays adequate comfort level or baseline comfort level  12/27/2022 1349 by Tamar Ho RN  Outcome: Progressing  12/27/2022 0452 by Kenji Zavala RN  Outcome: Progressing

## 2022-12-27 NOTE — CARE COORDINATION
B/Cr 31/6.8. IVFs, rocephin and flagyl continue. Stress test negative. Pt is home w/family and I w/o the use of any DMEs. Plans remain for pt to return home upon discharge. CM will continue to follow for HD needs.   Misbah Figueredo, DEDRICKN, RN  [facility] Case Management  [phone]

## 2022-12-27 NOTE — PROGRESS NOTES
Nephrology Progress Note  [facility]    Reason for Consult:   JUNIOR  Date of Service: 2022     Subjective    Patient seen and examined  No chest pain, sob, abd pain, nausea  Still with diarrhea - reports 4 episodes over last 24 hours         Past Medical History:        Diagnosis Date    Acid reflux     Hyperlipidemia     Hypertension     OCD (obsessive compulsive disorder)     Thyroid disease        Past Surgical History:        Procedure Laterality Date    ANKLE SURGERY Right      SECTION      COLONOSCOPY      ENDOSCOPY, COLON, DIAGNOSTIC      TOTAL KNEE ARTHROPLASTY Left     VOCAL CORD AUGMENTATION W/RADIESSE INJ      WRIST FRACTURE SURGERY Right 12/10/2022    RIGHT WRIST OPEN REDUCTION INTERNAL FIXATION WITH IRRIGATION AND DEBRIDEMENT performed by Julia Nathan MD at [address] [place] [address]       Current Medications:    Current Facility-Administered Medications: 0.9 % sodium chloride infusion, , IntraVENous, Continuous  guaiFENesin-dextromethorphan (ROBITUSSIN DM) 100-10 MG/5ML syrup 5 mL, 5 mL, Oral, Q4H PRN  HYDROcodone-acetaminophen (NORCO) 5-325 MG per tablet 1 tablet, 1 tablet, Oral, Q6H PRN  rosuvastatin (CRESTOR) tablet 10 mg, 10 mg, Oral, QAM  sodium chloride flush 0.9 % injection 10 mL, 10 mL, IntraVENous, PRN  levothyroxine (SYNTHROID) tablet 12.5 mcg, 12.5 mcg, Oral, Daily  [Held by provider] lisinopril (PRINIVIL;ZESTRIL) tablet 20 mg, 20 mg, Oral, Daily  rOPINIRole (REQUIP) tablet 1 mg, 1 mg, Oral, Nightly  sertraline (ZOLOFT) tablet 100 mg, 100 mg, Oral, Daily  sodium chloride flush 0.9 % injection 5-40 mL, 5-40 mL, IntraVENous, 2 times per day  sodium chloride flush 0.9 % injection 5-40 mL, 5-40 mL, IntraVENous, PRN  0.9 % sodium chloride infusion, , IntraVENous, PRN  heparin (porcine) injection 5,000 Units, 5,000 Units, SubCUTAneous, 3 times per day  ondansetron (ZOFRAN-ODT) disintegrating tablet 4 mg, 4 mg, Oral, Q8H PRN **OR** ondansetron (ZOFRAN) injection 4 mg, 4 mg, IntraVENous, Q6H PRN  acetaminophen (TYLENOL) tablet 650 mg, 650 mg, Oral, Q6H PRN **OR** acetaminophen (TYLENOL) suppository 650 mg, 650 mg, Rectal, Q6H PRN  cefTRIAXone (ROCEPHIN) 2,000 mg in sterile water 20 mL IV syringe, 2,000 mg, IntraVENous, Q24H  metronidazole (FLAGYL) 500 mg in 0.9% NaCl 100 mL IVPB premix, 500 mg, IntraVENous, Q8H    Allergies:  Patient has no known allergies. Physical exam:   Constitutional:  VITALS:  BP (!) 117/58   Pulse 68   Temp 98.9 °F (37.2 °C) (Oral)   Resp 18   Ht 5' 1\" (1.549 m)   Wt 168 lb 3.2 oz (76.3 kg)   SpO2 93%   BMI 31.78 kg/m²     Gen: alert, awake, no acute distress  Eyes: eomi  HEENT: atraumatic/normocephalic  Lungs: clear to ascultation bilaterally, equal lung expansion  CV: RRR, no murmurs  Abdomen: soft, nontender, nondistended, normoactive bowel sounds  Extremitiy: no edema  : no CVA tenderness, +wilkes   Skin: no rash, tugor wnl  Neuro: no focal deficits  Psych: cooperative and appropriate      Data:         Last 3 BMP  Recent Labs     12/26/22  0940 12/26/22  1625 12/27/22  0750    139 142   K 4.1 3.9 3.7    103 108*   CO2 26 21* 20*   BUN 34* 33* 31*   CREATININE 7.1* 6.9* 6.8*   GLUCOSE 110* 143* 95   CALCIUM 7.9* 7.7* 7.4*         Last 3 CMP:    Recent Labs     12/25/22  0450 12/25/22  1748 12/26/22  0940 12/26/22  1625 12/27/22  0750      < > 140 139 142   K 3.7   < > 4.1 3.9 3.7   *   < > 103 103 108*   CO2 12*   < > 26 21* 20*   BUN 31*   < > 34* 33* 31*   CREATININE 4.9*   < > 7.1* 6.9* 6.8*   GLUCOSE 79   < > 110* 143* 95   CALCIUM 6.3*   < > 7.9* 7.7* 7.4*   PROT 4.2*  --   --   --   --    LABALBU 2.3*  --   --   --   --    BILITOT <0.2  --   --   --   --    ALKPHOS 90  --   --   --   --    AST 13  --   --   --   --    ALT <5  --   --   --   --     < > = values in this interval not displayed.          Last 3 Glucose:     Recent Labs     12/26/22  0940 12/26/22  1625 12/27/22  0750   GLUCOSE 110* 143* 95         Last 3 POC Glucose:     No results for input(s): POCGLU in the last 72 hours. Last 3 CK, CKMB, Troponin  No results for input(s): CKTOTAL, CKMB, TROPONINI in the last 72 hours. Last 3 CBC:  Recent Labs     12/25/22  0450 12/26/22  0940 12/27/22  0750   WBC 5.0 4.3* 3.5*   RBC 3.00* 3.31* 3.24*   HGB 8.7* 9.4* 9.3*   HCT 27.5* 29.4* 29.0*   MCV 91.7 88.8 89.5   MCH 29.0 28.4 28.7   MCHC 31.6* 32.0 32.1   RDW 14.0 14.3 14.5    186 171   MPV 9.9 10.3 10.3       Last 3 Hepatic Function Panel:    Recent Labs     12/25/22  0450   ALKPHOS 90   ALT <5   AST 13   PROT 4.2*   BILITOT <0.2   LABALBU 2.3*       Albumin:  Recent Labs     12/25/22  0450   LABALBU 2.3*       Calcium:  Recent Labs     12/27/22  0750   CALCIUM 7.4*       Ionized Calcium:  No results for input(s): IONCA in the last 72 hours. Magnesium:    No results for input(s): MG in the last 72 hours. ABGs:  No results for input(s): PHART, PO2ART, QRT5FGW, IPH2OGI, BEART, N4AEDJKI in the last 72 hours. Lactic Acid:  No results for input(s): LACTA in the last 72 hours. Last 3 Amylase:  No results for input(s): AMYLASE in the last 72 hours. Last 3 Lipase:  No results for input(s): LIPASE in the last 72 hours. Last 3 BNP:  No results for input(s): BNP in the last 72 hours. Assessment/Plan    1. JUNIOR  With hypotension, diarrhea, ACEI  Possible ATN    Renal function poor but with some improvement today  Good urine output   Repeat urinalysis with microscopy and quantify proteinuria    Give IV fluids  No acute indication for dialysis but will follow closely      2. Metabolic acidosis  With renal failure, diarrhea, chloride rich IVF  Adjust IVF  Follow for need for bicarbonate      3.  Hypertension  With hypotension on admission  Blood pressure acceptable   Hold ACEI      Thank you for the opportunity to participate in the care of Ms Chi Bolden    ______________________________      Preeti Hinojosa MD  12/27/2022  3:24 PM

## 2022-12-28 LAB
ALBUMIN SERPL-MCNC: 2.6 G/DL (ref 3.5–5.2)
ALP BLD-CCNC: 123 U/L (ref 35–104)
ALT SERPL-CCNC: 9 U/L (ref 0–32)
ANION GAP SERPL CALCULATED.3IONS-SCNC: 12 MMOL/L (ref 7–16)
ANION GAP SERPL CALCULATED.3IONS-SCNC: 15 MMOL/L (ref 7–16)
AST SERPL-CCNC: 41 U/L (ref 0–31)
BILIRUB SERPL-MCNC: <0.2 MG/DL (ref 0–1.2)
BLOOD CULTURE, ROUTINE: NORMAL
BUN BLDV-MCNC: 26 MG/DL (ref 6–23)
BUN BLDV-MCNC: 29 MG/DL (ref 6–23)
CALCIUM SERPL-MCNC: 7.5 MG/DL (ref 8.6–10.2)
CALCIUM SERPL-MCNC: 7.5 MG/DL (ref 8.6–10.2)
CHLORIDE BLD-SCNC: 108 MMOL/L (ref 98–107)
CHLORIDE BLD-SCNC: 109 MMOL/L (ref 98–107)
CO2: 19 MMOL/L (ref 22–29)
CO2: 22 MMOL/L (ref 22–29)
CREAT SERPL-MCNC: 5.2 MG/DL (ref 0.5–1)
CREAT SERPL-MCNC: 6 MG/DL (ref 0.5–1)
CULTURE, BLOOD 2: NORMAL
GFR SERPL CREATININE-BSD FRML MDRD: 7 ML/MIN/1.73
GFR SERPL CREATININE-BSD FRML MDRD: 8 ML/MIN/1.73
GLUCOSE BLD-MCNC: 122 MG/DL (ref 74–99)
GLUCOSE BLD-MCNC: 92 MG/DL (ref 74–99)
HAPTOGLOBIN: 215 MG/DL (ref 30–200)
HCT VFR BLD CALC: 30.9 % (ref 34–48)
HEMOGLOBIN: 9.7 G/DL (ref 11.5–15.5)
LACTATE DEHYDROGENASE: 258 U/L (ref 135–214)
MAGNESIUM: 1.2 MG/DL (ref 1.6–2.6)
MAGNESIUM: 1.9 MG/DL (ref 1.6–2.6)
MCH RBC QN AUTO: 28.1 PG (ref 26–35)
MCHC RBC AUTO-ENTMCNC: 31.4 % (ref 32–34.5)
MCV RBC AUTO: 89.6 FL (ref 80–99.9)
PDW BLD-RTO: 14.6 FL (ref 11.5–15)
PHOSPHORUS: 5.9 MG/DL (ref 2.5–4.5)
PLATELET # BLD: 173 E9/L (ref 130–450)
PMV BLD AUTO: 10.3 FL (ref 7–12)
POTASSIUM SERPL-SCNC: 3.6 MMOL/L (ref 3.5–5)
POTASSIUM SERPL-SCNC: 3.8 MMOL/L (ref 3.5–5)
RBC # BLD: 3.45 E12/L (ref 3.5–5.5)
SODIUM BLD-SCNC: 142 MMOL/L (ref 132–146)
SODIUM BLD-SCNC: 143 MMOL/L (ref 132–146)
TOTAL CK: 73 U/L (ref 20–180)
TOTAL PROTEIN: 5.2 G/DL (ref 6.4–8.3)
WBC # BLD: 3.8 E9/L (ref 4.5–11.5)

## 2022-12-28 PROCEDURE — 6370000000 HC RX 637 (ALT 250 FOR IP): Performed by: NURSE PRACTITIONER

## 2022-12-28 PROCEDURE — 84165 PROTEIN E-PHORESIS SERUM: CPT

## 2022-12-28 PROCEDURE — 6360000002 HC RX W HCPCS: Performed by: NURSE PRACTITIONER

## 2022-12-28 PROCEDURE — 85027 COMPLETE CBC AUTOMATED: CPT

## 2022-12-28 PROCEDURE — 83735 ASSAY OF MAGNESIUM: CPT

## 2022-12-28 PROCEDURE — 80053 COMPREHEN METABOLIC PANEL: CPT

## 2022-12-28 PROCEDURE — 83615 LACTATE (LD) (LDH) ENZYME: CPT

## 2022-12-28 PROCEDURE — 2060000000 HC ICU INTERMEDIATE R&B

## 2022-12-28 PROCEDURE — 82550 ASSAY OF CK (CPK): CPT

## 2022-12-28 PROCEDURE — 86334 IMMUNOFIX E-PHORESIS SERUM: CPT

## 2022-12-28 PROCEDURE — 84100 ASSAY OF PHOSPHORUS: CPT

## 2022-12-28 PROCEDURE — 2500000003 HC RX 250 WO HCPCS: Performed by: NURSE PRACTITIONER

## 2022-12-28 PROCEDURE — 6370000000 HC RX 637 (ALT 250 FOR IP): Performed by: INTERNAL MEDICINE

## 2022-12-28 PROCEDURE — 36415 COLL VENOUS BLD VENIPUNCTURE: CPT

## 2022-12-28 PROCEDURE — 80048 BASIC METABOLIC PNL TOTAL CA: CPT

## 2022-12-28 PROCEDURE — 84166 PROTEIN E-PHORESIS/URINE/CSF: CPT

## 2022-12-28 PROCEDURE — 83010 ASSAY OF HAPTOGLOBIN QUANT: CPT

## 2022-12-28 PROCEDURE — 2580000003 HC RX 258: Performed by: NURSE PRACTITIONER

## 2022-12-28 RX ORDER — MAGNESIUM SULFATE IN WATER 40 MG/ML
2000 INJECTION, SOLUTION INTRAVENOUS ONCE
Status: COMPLETED | OUTPATIENT
Start: 2022-12-28 | End: 2022-12-28

## 2022-12-28 RX ORDER — SODIUM BICARBONATE 650 MG/1
1300 TABLET ORAL 2 TIMES DAILY
Status: DISCONTINUED | OUTPATIENT
Start: 2022-12-28 | End: 2022-12-30 | Stop reason: HOSPADM

## 2022-12-28 RX ORDER — SODIUM BICARBONATE 650 MG/1
650 TABLET ORAL 2 TIMES DAILY
Status: DISCONTINUED | OUTPATIENT
Start: 2022-12-28 | End: 2022-12-28

## 2022-12-28 RX ADMIN — SODIUM BICARBONATE 650 MG: 650 TABLET ORAL at 11:36

## 2022-12-28 RX ADMIN — ROSUVASTATIN CALCIUM 10 MG: 10 TABLET, FILM COATED ORAL at 09:57

## 2022-12-28 RX ADMIN — HEPARIN SODIUM 5000 UNITS: 10000 INJECTION INTRAVENOUS; SUBCUTANEOUS at 21:47

## 2022-12-28 RX ADMIN — METRONIDAZOLE 500 MG: 500 INJECTION, SOLUTION INTRAVENOUS at 02:48

## 2022-12-28 RX ADMIN — HEPARIN SODIUM 5000 UNITS: 10000 INJECTION INTRAVENOUS; SUBCUTANEOUS at 05:16

## 2022-12-28 RX ADMIN — HYDROCODONE BITARTRATE AND ACETAMINOPHEN 1 TABLET: 5; 325 TABLET ORAL at 20:33

## 2022-12-28 RX ADMIN — ONDANSETRON 4 MG: 2 INJECTION INTRAMUSCULAR; INTRAVENOUS at 10:16

## 2022-12-28 RX ADMIN — LEVOTHYROXINE SODIUM 12.5 MCG: 25 TABLET ORAL at 05:16

## 2022-12-28 RX ADMIN — METRONIDAZOLE 500 MG: 500 INJECTION, SOLUTION INTRAVENOUS at 18:41

## 2022-12-28 RX ADMIN — ROPINIROLE HYDROCHLORIDE 1 MG: 1 TABLET, FILM COATED ORAL at 20:33

## 2022-12-28 RX ADMIN — WATER 2000 MG: 1 INJECTION INTRAMUSCULAR; INTRAVENOUS; SUBCUTANEOUS at 09:51

## 2022-12-28 RX ADMIN — SODIUM BICARBONATE 1300 MG: 650 TABLET ORAL at 20:33

## 2022-12-28 RX ADMIN — MAGNESIUM SULFATE HEPTAHYDRATE 2000 MG: 40 INJECTION, SOLUTION INTRAVENOUS at 11:39

## 2022-12-28 RX ADMIN — METRONIDAZOLE 500 MG: 500 INJECTION, SOLUTION INTRAVENOUS at 10:02

## 2022-12-28 RX ADMIN — SERTRALINE 100 MG: 100 TABLET, FILM COATED ORAL at 09:57

## 2022-12-28 RX ADMIN — SODIUM CHLORIDE, PRESERVATIVE FREE 10 ML: 5 INJECTION INTRAVENOUS at 20:34

## 2022-12-28 RX ADMIN — HEPARIN SODIUM 5000 UNITS: 10000 INJECTION INTRAVENOUS; SUBCUTANEOUS at 13:57

## 2022-12-28 ASSESSMENT — PAIN - FUNCTIONAL ASSESSMENT: PAIN_FUNCTIONAL_ASSESSMENT: ACTIVITIES ARE NOT PREVENTED

## 2022-12-28 ASSESSMENT — PAIN DESCRIPTION - DESCRIPTORS: DESCRIPTORS: ACHING;DISCOMFORT

## 2022-12-28 ASSESSMENT — PAIN SCALES - GENERAL
PAINLEVEL_OUTOF10: 8
PAINLEVEL_OUTOF10: 0

## 2022-12-28 ASSESSMENT — PAIN DESCRIPTION - LOCATION: LOCATION: BACK

## 2022-12-28 ASSESSMENT — PAIN DESCRIPTION - ORIENTATION: ORIENTATION: LOWER
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
## 2022-12-28 NOTE — CARE COORDINATION
B/Cr 29/6.0. IVFs dc'd, rocephin and flagyl continue. Stress test negative. Pt is home w/family and I w/o the use of any DMEs. Plans remain for pt to return home upon discharge. CM will continue to follow for HD needs.   Te Palafox, DEDRICKN, RN  [facility] Case Management  [phone]

## 2022-12-28 NOTE — PROGRESS NOTES
Nephrology Progress Note  [facility]    Reason for Consult: JUNIOR  Date of Service: 2022     Subjective    22-awake and alert. She tells me diarrhea has subsided and she had a soft formed stool this morning.         Past Medical History:        Diagnosis Date    Acid reflux     Hyperlipidemia     Hypertension     OCD (obsessive compulsive disorder)     Thyroid disease        Past Surgical History:        Procedure Laterality Date    ANKLE SURGERY Right      SECTION      COLONOSCOPY      ENDOSCOPY, COLON, DIAGNOSTIC      TOTAL KNEE ARTHROPLASTY Left     VOCAL CORD AUGMENTATION W/RADIESSE INJ      WRIST FRACTURE SURGERY Right 12/10/2022    RIGHT WRIST OPEN REDUCTION INTERNAL FIXATION WITH IRRIGATION AND DEBRIDEMENT performed by Marco A Pal MD at [facility]       Current Medications:    Current Facility-Administered Medications: lactated ringers infusion, , IntraVENous, Continuous  guaiFENesin-dextromethorphan (ROBITUSSIN DM) 100-10 MG/5ML syrup 5 mL, 5 mL, Oral, Q4H PRN  HYDROcodone-acetaminophen (NORCO) 5-325 MG per tablet 1 tablet, 1 tablet, Oral, Q6H PRN  rosuvastatin (CRESTOR) tablet 10 mg, 10 mg, Oral, QAM  sodium chloride flush 0.9 % injection 10 mL, 10 mL, IntraVENous, PRN  levothyroxine (SYNTHROID) tablet 12.5 mcg, 12.5 mcg, Oral, Daily  [Held by provider] lisinopril (PRINIVIL;ZESTRIL) tablet 20 mg, 20 mg, Oral, Daily  rOPINIRole (REQUIP) tablet 1 mg, 1 mg, Oral, Nightly  sertraline (ZOLOFT) tablet 100 mg, 100 mg, Oral, Daily  sodium chloride flush 0.9 % injection 5-40 mL, 5-40 mL, IntraVENous, 2 times per day  sodium chloride flush 0.9 % injection 5-40 mL, 5-40 mL, IntraVENous, PRN  0.9 % sodium chloride infusion, , IntraVENous, PRN  heparin (porcine) injection 5,000 Units, 5,000 Units, SubCUTAneous, 3 times per day  ondansetron (ZOFRAN-ODT) disintegrating tablet 4 mg, 4 mg, Oral, Q8H PRN **OR** ondansetron (ZOFRAN) injection 4 mg, 4 mg, IntraVENous, Q6H PRN  acetaminophen (TYLENOL) tablet 650 mg, 650 mg, Oral, Q6H PRN **OR** acetaminophen (TYLENOL) suppository 650 mg, 650 mg, Rectal, Q6H PRN  cefTRIAXone (ROCEPHIN) 2,000 mg in sterile water 20 mL IV syringe, 2,000 mg, IntraVENous, Q24H  metronidazole (FLAGYL) 500 mg in 0.9% NaCl 100 mL IVPB premix, 500 mg, IntraVENous, Q8H    Allergies:  Patient has no known allergies. Physical exam:   Constitutional:  VITALS:  /77   Pulse 69   Temp 98.5 °F (36.9 °C) (Oral)   Resp 18   Ht 5' 1\" (1.549 m)   Wt 168 lb 3.2 oz (76.3 kg)   SpO2 96%   BMI 31.78 kg/m²     Gen: alert, awake, no acute distress  Eyes: eomi  HEENT: atraumatic/normocephalic  Lungs: clear to ascultation bilaterally, equal lung expansion  CV: S1-S2 no S3 or rub  Abdomen: soft, nontender, nondistended, normoactive bowel sounds  Extremitiy: no edema  Skin: no rash, tugor wnl  Neuro: no focal deficits, awake alert and oriented  Psych: cooperative and appropriate      Data:         Last 3 BMP  Recent Labs     12/26/22  1625 12/27/22  0750 12/28/22  0310    142 142   K 3.9 3.7 3.8    108* 108*   CO2 21* 20* 19*   BUN 33* 31* 29*   CREATININE 6.9* 6.8* 6.0*   GLUCOSE 143* 95 92   CALCIUM 7.7* 7.4* 7.5*         Last 3 CMP:    Recent Labs     12/26/22  1625 12/27/22  0750 12/28/22  0310    142 142   K 3.9 3.7 3.8    108* 108*   CO2 21* 20* 19*   BUN 33* 31* 29*   CREATININE 6.9* 6.8* 6.0*   GLUCOSE 143* 95 92   CALCIUM 7.7* 7.4* 7.5*   PROT  --   --  5.2*   LABALBU  --   --  2.6*   BILITOT  --   --  <0.2   ALKPHOS  --   --  123*   AST  --   --  41*   ALT  --   --  9         Last 3 Glucose:     Recent Labs     12/26/22  1625 12/27/22  0750 12/28/22  0310   GLUCOSE 143* 95 92         Last 3 POC Glucose:     No results for input(s): POCGLU in the last 72 hours. Last 3 CK, CKMB, Troponin  No results for input(s): CKTOTAL, CKMB, TROPONINI in the last 72 hours.       Last 3 CBC:  Recent Labs     12/26/22  0940 12/27/22  0750 12/28/22 0310   WBC 4.3* 3.5* 3.8*   RBC 3.31* 3.24* 3.45*   HGB 9.4* 9.3* 9.7*   HCT 29.4* 29.0* 30.9*   MCV 88.8 89.5 89.6   MCH 28.4 28.7 28.1   MCHC 32.0 32.1 31.4*   RDW 14.3 14.5 14.6    171 173   MPV 10.3 10.3 10.3       Last 3 Hepatic Function Panel:    Recent Labs     12/28/22 0310   ALKPHOS 123*   ALT 9   AST 41*   PROT 5.2*   BILITOT <0.2   LABALBU 2.6*       Albumin:  Recent Labs     12/28/22 0310   LABALBU 2.6*       Calcium:  Recent Labs     12/28/22 0310   CALCIUM 7.5*       Ionized Calcium:  No results for input(s): IONCA in the last 72 hours. Magnesium:    Recent Labs     12/28/22 0310   MG 1.2*         ABGs:  No results for input(s): PHART, PO2ART, HRY7DKM, TAY5IAV, BEART, F5MFWLXI in the last 72 hours. Lactic Acid:  No results for input(s): LACTA in the last 72 hours. Last 3 Amylase:  No results for input(s): AMYLASE in the last 72 hours. Last 3 Lipase:  No results for input(s): LIPASE in the last 72 hours. Last 3 BNP:  No results for input(s): BNP in the last 72 hours. Assessment/Plan    1. Acute kidney injury-  In the setting of ineffective renal perfusion due to hypotension, diarrhea, ACEI  Possible ATN  Creatinine peaked at 7.1 mg/dL on 12/26  Creatinine has trended to 6.0 dL today  Holding ACEI  UA repeated with trace protein; protein to creatinine ratio 0.6  Urine output over the past 24 hours 1750 mL as recorded for 1 shift  Stop IVF  Repeat BMP at 1600      2. Metabolic acidosis  In the setting of JUNIOR and ongoing GI losses through diarrhea  CO2 continues to trend low below goal greater than 22 mmol/L  Will start oral sodium bicarb    3. Hypertension  With hypotension on admission  BP at goal <130/80  Hold ACEI    4.   Hypomagnesemia-  In the setting of GI losses  Magnesium 1.2 will replace with 2 g of magnesium sulfate IV today  Will recheck BMP and magnesium at 1804 Gideon Prieto, APRN - CNP  12/28/2022  10:09 AM      The patient was seen, interviewed and examined by me with Waqas Sequeira nephrology nurse practitioner who I reviewed her note and I do agree on its contents. When I saw the patient, her  was present in the room. She told me her diarrhea is currently much better she had only 2 bowel movement of formed stool earlier today. She became slightly short of breath so I discontinued IV fluids. She is making excellent urine output she had Miles catheter placed with more than 1 L of clear urine in it. Slow improvement in acute kidney injury: Creatinine 7.1 --->  6.0 on top of stage IIIa chronic kidney disease baseline serum creatinine 1.2-1.3 with bland urine no white cells and no red cells. No hydronephrosis on CT scan. In the context of diarrhea suggesting either prerenal azotemia or acute tubular injury. At this point, creatinine is improving. Blood pressure stable. Diarrhea is improving. Urine output is adequate. Continue to observe. Continue to hold ACE inhibitor. Metabolic acidosis with anion gap of 15 reflecting both acute kidney injury and bicarbonate losses with diarrhea. Increase sodium bicarbonate supplementation. Patient had trace protein but 0.6 protein to creatinine ratio: Check UPEP and SPEP. Also patient had large blood on urine analysis with no red cells. Check CK. Check haptoglobin and LDH. Hypomagnesemia: Supplement and recheck in a.m. Hyperphosphatemia: In the context of acute kidney injury. Should improve if creatinine continues to improve. Anemia: Could be partly related to chronic kidney disease. No need to consider transfusion at this point.     Glenda Villalobos

## 2022-12-28 NOTE — PROGRESS NOTES
Physician Progress Note      Robyn Larsen  NARESH #:                  [id number]  :                       1950  ADMIT DATE:       2022 11:55 AM  DISCH DATE:  RESPONDING  PROVIDER #:        Kadi Padilla        QUERY TEXT:    Stage of Chronic Kidney Disease: Please provide further specificity, if known. Clinical indicators include: ckd, bun, creatinine, bun/creatinine  Options provided:  -- Chronic kidney disease stage 1  -- Chronic kidney disease stage 2  -- Chronic kidney disease stage 3  -- Chronic kidney disease stage 3a  -- Chronic kidney disease stage 3b  -- Chronic kidney disease stage 4  -- Chronic kidney disease stage 5  -- Chronic kidney disease stage 5, requiring dialysis  -- End stage renal disease  -- Other - I will add my own diagnosis  -- Disagree - Not applicable / Not valid  -- Disagree - Clinically Unable to determine / Unknown        PROVIDER RESPONSE TEXT:    The patient has chronic kidney disease stage 4.       Electronically signed by:  Kadi Padilla 2022 2:51 PM

## 2022-12-29 LAB
ADDENDUM ELECTROPHORESIS URINE RANDOM: NORMAL
ALBUMIN SERPL-MCNC: 2.2 G/DL (ref 3.5–4.7)
ALPHA-1-GLOBULIN: 0.4 G/DL (ref 0.2–0.4)
ALPHA-2-GLOBULIN: 0.9 G/DL (ref 0.5–1)
ANION GAP SERPL CALCULATED.3IONS-SCNC: 12 MMOL/L (ref 7–16)
BETA GLOBULIN: 0.8 G/DL (ref 0.8–1.3)
BUN BLDV-MCNC: 23 MG/DL (ref 6–23)
CALCIUM SERPL-MCNC: 7.7 MG/DL (ref 8.6–10.2)
CHLORIDE BLD-SCNC: 109 MMOL/L (ref 98–107)
CO2: 21 MMOL/L (ref 22–29)
CREAT SERPL-MCNC: 4.7 MG/DL (ref 0.5–1)
ELECTROPHORESIS: ABNORMAL
GAMMA GLOBULIN: 1 G/DL (ref 0.7–1.6)
GFR SERPL CREATININE-BSD FRML MDRD: 9 ML/MIN/1.73
GLUCOSE BLD-MCNC: 91 MG/DL (ref 74–99)
HCT VFR BLD CALC: 31.1 % (ref 34–48)
HEMOGLOBIN: 9.7 G/DL (ref 11.5–15.5)
IMMUNOFIXATION RESULT, SERUM: NORMAL
IMMUNOFIXATION URINE: NORMAL
MAGNESIUM: 1.6 MG/DL (ref 1.6–2.6)
MCH RBC QN AUTO: 27.9 PG (ref 26–35)
MCHC RBC AUTO-ENTMCNC: 31.2 % (ref 32–34.5)
MCV RBC AUTO: 89.4 FL (ref 80–99.9)
PDW BLD-RTO: 14.8 FL (ref 11.5–15)
PHOSPHORUS: 5.5 MG/DL (ref 2.5–4.5)
PLATELET # BLD: 191 E9/L (ref 130–450)
PMV BLD AUTO: 9.9 FL (ref 7–12)
POTASSIUM SERPL-SCNC: 3.5 MMOL/L (ref 3.5–5)
RBC # BLD: 3.48 E12/L (ref 3.5–5.5)
SODIUM BLD-SCNC: 142 MMOL/L (ref 132–146)
TOTAL PROTEIN: 5.3 G/DL (ref 6.4–8.3)
WBC # BLD: 3.1 E9/L (ref 4.5–11.5)

## 2022-12-29 PROCEDURE — 6370000000 HC RX 637 (ALT 250 FOR IP): Performed by: INTERNAL MEDICINE

## 2022-12-29 PROCEDURE — 2500000003 HC RX 250 WO HCPCS: Performed by: NURSE PRACTITIONER

## 2022-12-29 PROCEDURE — 80048 BASIC METABOLIC PNL TOTAL CA: CPT

## 2022-12-29 PROCEDURE — 2060000000 HC ICU INTERMEDIATE R&B

## 2022-12-29 PROCEDURE — 85027 COMPLETE CBC AUTOMATED: CPT

## 2022-12-29 PROCEDURE — 2580000003 HC RX 258: Performed by: NURSE PRACTITIONER

## 2022-12-29 PROCEDURE — 6370000000 HC RX 637 (ALT 250 FOR IP): Performed by: NURSE PRACTITIONER

## 2022-12-29 PROCEDURE — 83735 ASSAY OF MAGNESIUM: CPT

## 2022-12-29 PROCEDURE — 6360000002 HC RX W HCPCS: Performed by: NURSE PRACTITIONER

## 2022-12-29 PROCEDURE — 36415 COLL VENOUS BLD VENIPUNCTURE: CPT

## 2022-12-29 PROCEDURE — 84100 ASSAY OF PHOSPHORUS: CPT

## 2022-12-29 RX ORDER — MAGNESIUM SULFATE IN WATER 40 MG/ML
2000 INJECTION, SOLUTION INTRAVENOUS ONCE
Status: COMPLETED | OUTPATIENT
Start: 2022-12-29 | End: 2022-12-29

## 2022-12-29 RX ORDER — LOPERAMIDE HYDROCHLORIDE 2 MG/1
2 CAPSULE ORAL 4 TIMES DAILY PRN
Status: DISCONTINUED | OUTPATIENT
Start: 2022-12-29 | End: 2022-12-30 | Stop reason: HOSPADM

## 2022-12-29 RX ADMIN — SODIUM BICARBONATE 1300 MG: 650 TABLET ORAL at 07:56

## 2022-12-29 RX ADMIN — SODIUM CHLORIDE, PRESERVATIVE FREE 10 ML: 5 INJECTION INTRAVENOUS at 07:56

## 2022-12-29 RX ADMIN — METRONIDAZOLE 500 MG: 500 INJECTION, SOLUTION INTRAVENOUS at 09:53

## 2022-12-29 RX ADMIN — WATER 2000 MG: 1 INJECTION INTRAMUSCULAR; INTRAVENOUS; SUBCUTANEOUS at 07:56

## 2022-12-29 RX ADMIN — ROPINIROLE HYDROCHLORIDE 1 MG: 1 TABLET, FILM COATED ORAL at 19:58

## 2022-12-29 RX ADMIN — HEPARIN SODIUM 5000 UNITS: 10000 INJECTION INTRAVENOUS; SUBCUTANEOUS at 22:49

## 2022-12-29 RX ADMIN — SODIUM BICARBONATE 1300 MG: 650 TABLET ORAL at 19:58

## 2022-12-29 RX ADMIN — MAGNESIUM SULFATE HEPTAHYDRATE 2000 MG: 40 INJECTION, SOLUTION INTRAVENOUS at 11:35

## 2022-12-29 RX ADMIN — HYDROCODONE BITARTRATE AND ACETAMINOPHEN 1 TABLET: 5; 325 TABLET ORAL at 19:58

## 2022-12-29 RX ADMIN — SERTRALINE 100 MG: 100 TABLET, FILM COATED ORAL at 07:56

## 2022-12-29 RX ADMIN — SODIUM CHLORIDE, PRESERVATIVE FREE 10 ML: 5 INJECTION INTRAVENOUS at 19:59

## 2022-12-29 RX ADMIN — LEVOTHYROXINE SODIUM 12.5 MCG: 25 TABLET ORAL at 05:39

## 2022-12-29 RX ADMIN — HEPARIN SODIUM 5000 UNITS: 10000 INJECTION INTRAVENOUS; SUBCUTANEOUS at 13:55

## 2022-12-29 RX ADMIN — ROSUVASTATIN CALCIUM 10 MG: 10 TABLET, FILM COATED ORAL at 07:56

## 2022-12-29 RX ADMIN — METRONIDAZOLE 500 MG: 500 INJECTION, SOLUTION INTRAVENOUS at 02:56

## 2022-12-29 RX ADMIN — HEPARIN SODIUM 5000 UNITS: 10000 INJECTION INTRAVENOUS; SUBCUTANEOUS at 05:40

## 2022-12-29 ASSESSMENT — PAIN DESCRIPTION - LOCATION: LOCATION: BACK

## 2022-12-29 ASSESSMENT — PAIN DESCRIPTION - DESCRIPTORS: DESCRIPTORS: ACHING;DISCOMFORT

## 2022-12-29 ASSESSMENT — PAIN SCALES - GENERAL: PAINLEVEL_OUTOF10: 8

## 2022-12-29 ASSESSMENT — PAIN DESCRIPTION - ORIENTATION: ORIENTATION: LOWER

## 2022-12-29 NOTE — PROGRESS NOTES
Physical Therapy  Facility/Department: [address] INTERNAL MEDICINE 2    Name: Natalie Lainez  : 1950  MRN: [id number]  Date of Service: 2022    Order received for PT evaluation. Pt up independently in room. Denies any PT needs at this time.    Javon Young PT [id number]

## 2022-12-29 NOTE — PROGRESS NOTES
Nephrology Progress Note  [facility]    Reason for Consult: JUNIOR  Date of Service: 2022     Subjective      22-to the bedside commode at this time I went to visit her. She continues to have frequent bowel movements somewhat formed but are occurring as she eats.       Past Medical History:        Diagnosis Date    Acid reflux     Hyperlipidemia     Hypertension     OCD (obsessive compulsive disorder)     Thyroid disease        Past Surgical History:        Procedure Laterality Date    ANKLE SURGERY Right      SECTION      COLONOSCOPY      ENDOSCOPY, COLON, DIAGNOSTIC      TOTAL KNEE ARTHROPLASTY Left     VOCAL CORD AUGMENTATION W/RADIESSE INJ      WRIST FRACTURE SURGERY Right 12/10/2022    RIGHT WRIST OPEN REDUCTION INTERNAL FIXATION WITH IRRIGATION AND DEBRIDEMENT performed by Lopez Castellon MD at [facility] OR       Current Medications:    Current Facility-Administered Medications: sodium bicarbonate tablet 1,300 mg, 1,300 mg, Oral, BID  guaiFENesin-dextromethorphan (ROBITUSSIN DM) 100-10 MG/5ML syrup 5 mL, 5 mL, Oral, Q4H PRN  HYDROcodone-acetaminophen (NORCO) 5-325 MG per tablet 1 tablet, 1 tablet, Oral, Q6H PRN  rosuvastatin (CRESTOR) tablet 10 mg, 10 mg, Oral, QAM  sodium chloride flush 0.9 % injection 10 mL, 10 mL, IntraVENous, PRN  levothyroxine (SYNTHROID) tablet 12.5 mcg, 12.5 mcg, Oral, Daily  [Held by provider] lisinopril (PRINIVIL;ZESTRIL) tablet 20 mg, 20 mg, Oral, Daily  rOPINIRole (REQUIP) tablet 1 mg, 1 mg, Oral, Nightly  sertraline (ZOLOFT) tablet 100 mg, 100 mg, Oral, Daily  sodium chloride flush 0.9 % injection 5-40 mL, 5-40 mL, IntraVENous, 2 times per day  sodium chloride flush 0.9 % injection 5-40 mL, 5-40 mL, IntraVENous, PRN  0.9 % sodium chloride infusion, , IntraVENous, PRN  heparin (porcine) injection 5,000 Units, 5,000 Units, SubCUTAneous, 3 times per day  ondansetron (ZOFRAN-ODT) disintegrating tablet 4 mg, 4 mg, Oral, Q8H PRN **OR** ondansetron [facility]) injection 4 mg, 4 mg, IntraVENous, Q6H PRN  acetaminophen (TYLENOL) tablet 650 mg, 650 mg, Oral, Q6H PRN **OR** acetaminophen (TYLENOL) suppository 650 mg, 650 mg, Rectal, Q6H PRN  cefTRIAXone (ROCEPHIN) 2,000 mg in sterile water 20 mL IV syringe, 2,000 mg, IntraVENous, Q24H  metronidazole (FLAGYL) 500 mg in 0.9% NaCl 100 mL IVPB premix, 500 mg, IntraVENous, Q8H    Allergies:  Patient has no known allergies. Physical exam:   Constitutional:  VITALS:  /74   Pulse 66   Temp 97.9 °F (36.6 °C) (Oral)   Resp 18   Ht 5' 1\" (1.549 m)   Wt 164 lb 4 oz (74.5 kg)   SpO2 97%   BMI 31.03 kg/m²     Gen: alert, awake, no acute distress  Eyes: eomi  HEENT: atraumatic/normocephalic  Lungs: clear to ascultation bilaterally, equal lung expansion  CV: S1-S2 no S3 or rub  Abdomen: soft, nontender, nondistended, normoactive bowel sounds  Extremitiy: no edema  Skin: no rash, tugor wnl  Neuro: no focal deficits, awake alert and oriented  Psych: cooperative and appropriate      Data:         Last 3 BMP  Recent Labs     12/28/22  0310 12/28/22  1655 12/29/22  0353    143 142   K 3.8 3.6 3.5   * 109* 109*   CO2 19* 22 21*   BUN 29* 26* 23   CREATININE 6.0* 5.2* 4.7*   GLUCOSE 92 122* 91   CALCIUM 7.5* 7.5* 7.7*         Last 3 CMP:    Recent Labs     12/28/22  0310 12/28/22  1655 12/29/22  0353    143 142   K 3.8 3.6 3.5   * 109* 109*   CO2 19* 22 21*   BUN 29* 26* 23   CREATININE 6.0* 5.2* 4.7*   GLUCOSE 92 122* 91   CALCIUM 7.5* 7.5* 7.7*   PROT 5.2*  --   --    LABALBU 2.6*  --   --    BILITOT <0.2  --   --    ALKPHOS 123*  --   --    AST 41*  --   --    ALT 9  --   --          Last 3 Glucose:     Recent Labs     12/28/22  0310 12/28/22  1655 12/29/22  0353   GLUCOSE 92 122* 91         Last 3 POC Glucose:     No results for input(s): POCGLU in the last 72 hours.     Last 3 CK, CKMB, Troponin  Recent Labs     12/28/22  1655   CKTOTAL 73         Last 3 CBC:  Recent Labs     12/27/22  0750 12/28/22 0310 12/29/22 0353   WBC 3.5* 3.8* 3.1*   RBC 3.24* 3.45* 3.48*   HGB 9.3* 9.7* 9.7*   HCT 29.0* 30.9* 31.1*   MCV 89.5 89.6 89.4   MCH 28.7 28.1 27.9   MCHC 32.1 31.4* 31.2*   RDW 14.5 14.6 14.8    173 191   MPV 10.3 10.3 9.9       Last 3 Hepatic Function Panel:    Recent Labs     12/28/22 0310   ALKPHOS 123*   ALT 9   AST 41*   PROT 5.2*   BILITOT <0.2   LABALBU 2.6*       Albumin:  Recent Labs     12/28/22 0310   LABALBU 2.6*       Calcium:  Recent Labs     12/29/22 0353   CALCIUM 7.7*       Ionized Calcium:  No results for input(s): IONCA in the last 72 hours. Magnesium:    Recent Labs     12/29/22 0353   MG 1.6         ABGs:  No results for input(s): PHART, PO2ART, ZFU7NPS, ETX6NFA, BEART, [id number] in the last 72 hours. Lactic Acid:  No results for input(s): LACTA in the last 72 hours. Last 3 Amylase:  No results for input(s): AMYLASE in the last 72 hours. Last 3 Lipase:  No results for input(s): LIPASE in the last 72 hours. Last 3 BNP:  No results for input(s): BNP in the last 72 hours. Assessment/Plan    1. Acute kidney injury-  In the setting of ineffective renal perfusion due to hypotension, diarrhea, ACEI  Baseline serum creatinine 1.2 to 1.3 mg/dL  Creatinine peaked at 7.1 mg/dL on 12/26  Creatinine has trended to 4.7 mg/dL  Holding ACEI  UA repeated with trace protein; protein to creatinine ratio 0.6  Urine output over the past 24 hours 2850 mL  She is off IV fluids    2. Metabolic acidosis  In the setting of JUNIOR and ongoing GI losses through diarrhea, both improving  CO2 continues to trend low below goal greater than 22 mmol/L  Increased sodium bicarbonate yesterday with improving trend    3. Hypertension  With hypotension on admission  BP at goal <130/80  Currently off ACEI    4. Hypomagnesemia-  In the setting of GI losses  Magnesium 1.6-replace 2 g magnesium sulfate IV    5.   Anemia-  Possibly related to chronic kidney disease  Check iron studies, B12 and folate in the morning    6. Proteinuria-  UA with trace protein, protein creatinine ratio 0.6  Checking UPEP and SPEP  Also with large amount of blood loss UA but no red cells CK 73, LDH-258 and haptoglobin- [address] Dilan Gonsalez, APRN - CNP  12/29/2022  11:11 AM  Patient seen and examined. I had a face to face encounter with the patient. She notes her stools are \" now like cow patties\". She denies lightheadedness or dizziness  Agree with exam.    Agree with  formulation, assessment and plan as outlined above and directed by me. Addition and corrections were done as deemed appropriate.    My exam and plan include:    A/P:  Stage III JUNIOR-cr trending down-UO 2850ml yesterday-continue to follow labs    Agree with the remainder a above-Continue current treatment as outlined above    ARSENIO Galicia MD

## 2022-12-29 NOTE — CARE COORDINATION
12/29/2022  Social Work Discharge Planning:monitoring creatinine. Awaiting therapy evals. Plan is home with family. No current needs. No HD needs at this time.  Electronically signed by ROSIE Russell on 12/29/2022 at 11:32 AM
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
## 2022-12-30 VITALS
SYSTOLIC BLOOD PRESSURE: 133 MMHG | HEART RATE: 63 BPM | RESPIRATION RATE: 18 BRPM | DIASTOLIC BLOOD PRESSURE: 67 MMHG | WEIGHT: 164.6 LBS | TEMPERATURE: 98.5 F | HEIGHT: 61 IN | BODY MASS INDEX: 31.08 KG/M2 | OXYGEN SATURATION: 97 %

## 2022-12-30 LAB
ANION GAP SERPL CALCULATED.3IONS-SCNC: 12 MMOL/L (ref 7–16)
BUN BLDV-MCNC: 19 MG/DL (ref 6–23)
CALCIUM SERPL-MCNC: 7.8 MG/DL (ref 8.6–10.2)
CHLORIDE BLD-SCNC: 110 MMOL/L (ref 98–107)
CO2: 24 MMOL/L (ref 22–29)
CREAT SERPL-MCNC: 3.4 MG/DL (ref 0.5–1)
FERRITIN: 278 NG/ML
FOLATE: 17.2 NG/ML (ref 4.8–24.2)
GFR SERPL CREATININE-BSD FRML MDRD: 14 ML/MIN/1.73
GLUCOSE BLD-MCNC: 96 MG/DL (ref 74–99)
HCT VFR BLD CALC: 33 % (ref 34–48)
HEMOGLOBIN: 10.4 G/DL (ref 11.5–15.5)
IRON SATURATION: 28 % (ref 15–50)
IRON: 62 MCG/DL (ref 37–145)
MAGNESIUM: 1.7 MG/DL (ref 1.6–2.6)
MCH RBC QN AUTO: 28.3 PG (ref 26–35)
MCHC RBC AUTO-ENTMCNC: 31.5 % (ref 32–34.5)
MCV RBC AUTO: 89.9 FL (ref 80–99.9)
PDW BLD-RTO: 14.7 FL (ref 11.5–15)
PHOSPHORUS: 4.6 MG/DL (ref 2.5–4.5)
PLATELET # BLD: 179 E9/L (ref 130–450)
PMV BLD AUTO: 9.8 FL (ref 7–12)
POTASSIUM SERPL-SCNC: 3.3 MMOL/L (ref 3.5–5)
RBC # BLD: 3.67 E12/L (ref 3.5–5.5)
SODIUM BLD-SCNC: 146 MMOL/L (ref 132–146)
TOTAL IRON BINDING CAPACITY: 224 MCG/DL (ref 250–450)
VITAMIN B-12: 705 PG/ML (ref 211–946)
WBC # BLD: 4.4 E9/L (ref 4.5–11.5)

## 2022-12-30 PROCEDURE — 83540 ASSAY OF IRON: CPT

## 2022-12-30 PROCEDURE — 85027 COMPLETE CBC AUTOMATED: CPT

## 2022-12-30 PROCEDURE — 6370000000 HC RX 637 (ALT 250 FOR IP): Performed by: NURSE PRACTITIONER

## 2022-12-30 PROCEDURE — 82607 VITAMIN B-12: CPT

## 2022-12-30 PROCEDURE — 82728 ASSAY OF FERRITIN: CPT

## 2022-12-30 PROCEDURE — 6370000000 HC RX 637 (ALT 250 FOR IP): Performed by: INTERNAL MEDICINE

## 2022-12-30 PROCEDURE — 82746 ASSAY OF FOLIC ACID SERUM: CPT

## 2022-12-30 PROCEDURE — 83735 ASSAY OF MAGNESIUM: CPT

## 2022-12-30 PROCEDURE — 83550 IRON BINDING TEST: CPT

## 2022-12-30 PROCEDURE — 2580000003 HC RX 258: Performed by: NURSE PRACTITIONER

## 2022-12-30 PROCEDURE — 84100 ASSAY OF PHOSPHORUS: CPT

## 2022-12-30 PROCEDURE — 80048 BASIC METABOLIC PNL TOTAL CA: CPT

## 2022-12-30 PROCEDURE — 36415 COLL VENOUS BLD VENIPUNCTURE: CPT

## 2022-12-30 PROCEDURE — 6360000002 HC RX W HCPCS: Performed by: NURSE PRACTITIONER

## 2022-12-30 RX ORDER — GUAIFENESIN/DEXTROMETHORPHAN 100-10MG/5
5 SYRUP ORAL 4 TIMES DAILY PRN
Qty: 120 ML | Refills: 0 | Status: ON HOLD | OUTPATIENT
Start: 2022-12-30 | End: 2023-01-07 | Stop reason: HOSPADM

## 2022-12-30 RX ORDER — POTASSIUM CHLORIDE 20 MEQ/1
40 TABLET, EXTENDED RELEASE ORAL ONCE
Status: DISCONTINUED | OUTPATIENT
Start: 2022-12-30 | End: 2022-12-30 | Stop reason: HOSPADM

## 2022-12-30 RX ADMIN — SODIUM CHLORIDE, PRESERVATIVE FREE 10 ML: 5 INJECTION INTRAVENOUS at 08:34

## 2022-12-30 RX ADMIN — HEPARIN SODIUM 5000 UNITS: 10000 INJECTION INTRAVENOUS; SUBCUTANEOUS at 06:07

## 2022-12-30 RX ADMIN — LEVOTHYROXINE SODIUM 12.5 MCG: 25 TABLET ORAL at 06:08

## 2022-12-30 RX ADMIN — SODIUM BICARBONATE 1300 MG: 650 TABLET ORAL at 08:33

## 2022-12-30 RX ADMIN — SERTRALINE 100 MG: 100 TABLET, FILM COATED ORAL at 08:33

## 2022-12-30 RX ADMIN — ROSUVASTATIN CALCIUM 10 MG: 10 TABLET, FILM COATED ORAL at 08:33
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
## 2023-01-04 ENCOUNTER — APPOINTMENT (OUTPATIENT)
Dept: CT IMAGING | Age: 73
DRG: 291 | End: 2023-01-04
Payer: MEDICARE

## 2023-01-04 ENCOUNTER — APPOINTMENT (OUTPATIENT)
Dept: GENERAL RADIOLOGY | Age: 73
DRG: 291 | End: 2023-01-04
Payer: MEDICARE

## 2023-01-04 ENCOUNTER — TELEPHONE (OUTPATIENT)
Dept: ORTHOPEDIC SURGERY | Age: 73
End: 2023-01-04

## 2023-01-04 ENCOUNTER — HOSPITAL ENCOUNTER (INPATIENT)
Age: 73
LOS: 3 days | Discharge: HOME | DRG: 291 | End: 2023-01-07
Attending: EMERGENCY MEDICINE | Admitting: INTERNAL MEDICINE
Payer: MEDICARE

## 2023-01-04 DIAGNOSIS — R52 PAIN: Primary | ICD-10-CM

## 2023-01-04 DIAGNOSIS — I50.9 ACUTE CONGESTIVE HEART FAILURE, UNSPECIFIED HEART FAILURE TYPE (HCC): ICD-10-CM

## 2023-01-04 DIAGNOSIS — I50.9 CONGESTIVE HEART FAILURE, UNSPECIFIED HF CHRONICITY, UNSPECIFIED HEART FAILURE TYPE (HCC): Primary | ICD-10-CM

## 2023-01-04 DIAGNOSIS — R05.1 ACUTE COUGH: ICD-10-CM

## 2023-01-04 DIAGNOSIS — E87.6 HYPOKALEMIA: ICD-10-CM

## 2023-01-04 LAB
ALBUMIN SERPL-MCNC: 3.4 G/DL (ref 3.5–5.2)
ALP BLD-CCNC: 107 U/L (ref 35–104)
ALT SERPL-CCNC: 9 U/L (ref 0–32)
ANION GAP SERPL CALCULATED.3IONS-SCNC: 13 MMOL/L (ref 7–16)
AST SERPL-CCNC: 19 U/L (ref 0–31)
BASOPHILS ABSOLUTE: 0.02 E9/L (ref 0–0.2)
BASOPHILS RELATIVE PERCENT: 0.2 % (ref 0–2)
BILIRUB SERPL-MCNC: 0.4 MG/DL (ref 0–1.2)
BUN BLDV-MCNC: 8 MG/DL (ref 6–23)
CALCIUM SERPL-MCNC: 8.4 MG/DL (ref 8.6–10.2)
CHLORIDE BLD-SCNC: 109 MMOL/L (ref 98–107)
CO2: 23 MMOL/L (ref 22–29)
CREAT SERPL-MCNC: 1.2 MG/DL (ref 0.5–1)
EKG ATRIAL RATE: 91 BPM
EKG P AXIS: 51 DEGREES
EKG P-R INTERVAL: 134 MS
EKG Q-T INTERVAL: 468 MS
EKG QRS DURATION: 58 MS
EKG QTC CALCULATION (BAZETT): 575 MS
EKG R AXIS: 39 DEGREES
EKG T AXIS: -100 DEGREES
EKG VENTRICULAR RATE: 91 BPM
EOSINOPHILS ABSOLUTE: 0.1 E9/L (ref 0.05–0.5)
EOSINOPHILS RELATIVE PERCENT: 1.1 % (ref 0–6)
GFR SERPL CREATININE-BSD FRML MDRD: 48 ML/MIN/1.73
GLUCOSE BLD-MCNC: 109 MG/DL (ref 74–99)
HCT VFR BLD CALC: 31.7 % (ref 34–48)
HEMOGLOBIN: 10.4 G/DL (ref 11.5–15.5)
IMMATURE GRANULOCYTES #: 0.04 E9/L
IMMATURE GRANULOCYTES %: 0.5 % (ref 0–5)
LYMPHOCYTES ABSOLUTE: 1.15 E9/L (ref 1.5–4)
LYMPHOCYTES RELATIVE PERCENT: 13.1 % (ref 20–42)
MAGNESIUM: 1.4 MG/DL (ref 1.6–2.6)
MCH RBC QN AUTO: 27.9 PG (ref 26–35)
MCHC RBC AUTO-ENTMCNC: 32.8 % (ref 32–34.5)
MCV RBC AUTO: 85 FL (ref 80–99.9)
MONOCYTES ABSOLUTE: 0.74 E9/L (ref 0.1–0.95)
MONOCYTES RELATIVE PERCENT: 8.4 % (ref 2–12)
NEUTROPHILS ABSOLUTE: 6.75 E9/L (ref 1.8–7.3)
NEUTROPHILS RELATIVE PERCENT: 76.7 % (ref 43–80)
PDW BLD-RTO: 14.7 FL (ref 11.5–15)
PLATELET # BLD: 231 E9/L (ref 130–450)
PMV BLD AUTO: 9.8 FL (ref 7–12)
POTASSIUM SERPL-SCNC: 3 MMOL/L (ref 3.5–5)
PRO-BNP: ABNORMAL PG/ML (ref 0–125)
RBC # BLD: 3.73 E12/L (ref 3.5–5.5)
SODIUM BLD-SCNC: 145 MMOL/L (ref 132–146)
TOTAL PROTEIN: 6.5 G/DL (ref 6.4–8.3)
TROPONIN, HIGH SENSITIVITY: 30 NG/L (ref 0–9)
TROPONIN, HIGH SENSITIVITY: 32 NG/L (ref 0–9)
WBC # BLD: 8.8 E9/L (ref 4.5–11.5)

## 2023-01-04 PROCEDURE — 71275 CT ANGIOGRAPHY CHEST: CPT

## 2023-01-04 PROCEDURE — 84484 ASSAY OF TROPONIN QUANT: CPT

## 2023-01-04 PROCEDURE — 6360000004 HC RX CONTRAST MEDICATION: Performed by: RADIOLOGY

## 2023-01-04 PROCEDURE — 85025 COMPLETE CBC W/AUTO DIFF WBC: CPT

## 2023-01-04 PROCEDURE — 2140000000 HC CCU INTERMEDIATE R&B

## 2023-01-04 PROCEDURE — 83735 ASSAY OF MAGNESIUM: CPT

## 2023-01-04 PROCEDURE — 93010 ELECTROCARDIOGRAM REPORT: CPT | Performed by: INTERNAL MEDICINE

## 2023-01-04 PROCEDURE — 6360000002 HC RX W HCPCS: Performed by: EMERGENCY MEDICINE

## 2023-01-04 PROCEDURE — 99285 EMERGENCY DEPT VISIT HI MDM: CPT

## 2023-01-04 PROCEDURE — 96374 THER/PROPH/DIAG INJ IV PUSH: CPT

## 2023-01-04 PROCEDURE — 83880 ASSAY OF NATRIURETIC PEPTIDE: CPT

## 2023-01-04 PROCEDURE — 71046 X-RAY EXAM CHEST 2 VIEWS: CPT

## 2023-01-04 PROCEDURE — 93005 ELECTROCARDIOGRAM TRACING: CPT | Performed by: PHYSICIAN ASSISTANT

## 2023-01-04 PROCEDURE — 80053 COMPREHEN METABOLIC PANEL: CPT

## 2023-01-04 RX ORDER — POTASSIUM CHLORIDE 7.45 MG/ML
10 INJECTION INTRAVENOUS
Status: COMPLETED | OUTPATIENT
Start: 2023-01-04 | End: 2023-01-05

## 2023-01-04 RX ORDER — POTASSIUM CHLORIDE 20 MEQ/1
40 TABLET, EXTENDED RELEASE ORAL ONCE
Status: COMPLETED | OUTPATIENT
Start: 2023-01-04 | End: 2023-01-05

## 2023-01-04 RX ORDER — FUROSEMIDE 10 MG/ML
40 INJECTION INTRAMUSCULAR; INTRAVENOUS ONCE
Status: COMPLETED | OUTPATIENT
Start: 2023-01-04 | End: 2023-01-04

## 2023-01-04 RX ADMIN — FUROSEMIDE 40 MG: 10 INJECTION, SOLUTION INTRAMUSCULAR; INTRAVENOUS at 22:26

## 2023-01-04 RX ADMIN — IOPAMIDOL 75 ML: 755 INJECTION, SOLUTION INTRAVENOUS at 23:35

## 2023-01-04 ASSESSMENT — PAIN - FUNCTIONAL ASSESSMENT: PAIN_FUNCTIONAL_ASSESSMENT: NONE - DENIES PAIN

## 2023-01-04 NOTE — TELEPHONE ENCOUNTER
Date of Procedure: 12/10/2022  Pre-Op Diagnosis: Right grade 2 open intra-articular distal radius fracture with significant displacement  Post-Op Diagnosis: Same   Procedure:  1. Irrigation debridement grade 2 open fracture right wrist including skin, subcutaneous tissue, muscle and devitalized bone  2. Open reduction internal fixation intra-articular right significantly displaced distal radius fracture    Patient has not been seen as her post op appt  We need to see her in office this week if she can get here?   Electronically signed by Tin Bryant PA-C on 1/4/2023 at 8:39 AM

## 2023-01-04 NOTE — TELEPHONE ENCOUNTER
Call to pt lvm appt scheduled   Future Appointments   Date Time Provider Marc Gonzales   1/4/2023  1:00 PM MD Cornelius Burdick [phone] to contact office if cannot keep appt.

## 2023-01-04 NOTE — ED NOTES
Spoke with orthopedics and they want an ortho consult once she gets to a tx area.  She was supposed to have a f/u this afternoon for xray and possible cast removal.     Joselin Singh PA-C  01/04/23 [id number]

## 2023-01-04 NOTE — ED NOTES
Department of Emergency Medicine  FIRST PROVIDER TRIAGE NOTE             Independent MLP           1/4/23  11:09 AM EST    Date of Encounter: 1/4/23   MRN: [id number]      HPI: Mulugeta Gilbert is a 81 y.o. female who presents to the ED for No chief complaint on file. Pt presenting with sob and cp x 1 week. Was admitted for renal failure and discharged but sob getting worse    ROS: Negative for abdominal pain or back pain. PE: Gen Appearance/Constitutional: alert  HEENT: NC/NT. PERRLA,  Airway patent. Initial Plan of Care: All treatment areas with department are currently occupied. Plan to order/Initiate the following while awaiting opening in ED: labs, EKG, and imaging studies.   Initiate Treatment-Testing, Proceed toTreatment Area When Bed Available for ED Attending/MLP to Continue Care    Electronically signed by Emily Nolasco PA-C   DD: 1/4/23      Emily Nolasco PA-C  01/04/23 1111

## 2023-01-05 ENCOUNTER — APPOINTMENT (OUTPATIENT)
Dept: GENERAL RADIOLOGY | Age: 73
DRG: 291 | End: 2023-01-05
Payer: MEDICARE

## 2023-01-05 LAB
ALBUMIN SERPL-MCNC: 3.5 G/DL (ref 3.5–5.2)
ALP BLD-CCNC: 105 U/L (ref 35–104)
ALT SERPL-CCNC: 9 U/L (ref 0–32)
ANION GAP SERPL CALCULATED.3IONS-SCNC: 13 MMOL/L (ref 7–16)
AST SERPL-CCNC: 18 U/L (ref 0–31)
BASOPHILS ABSOLUTE: 0.03 E9/L (ref 0–0.2)
BASOPHILS RELATIVE PERCENT: 0.4 % (ref 0–2)
BILIRUB SERPL-MCNC: 0.4 MG/DL (ref 0–1.2)
BILIRUBIN DIRECT: <0.2 MG/DL (ref 0–0.3)
BILIRUBIN, INDIRECT: ABNORMAL MG/DL (ref 0–1)
BUN BLDV-MCNC: 7 MG/DL (ref 6–23)
CALCIUM SERPL-MCNC: 8.1 MG/DL (ref 8.6–10.2)
CHLORIDE BLD-SCNC: 106 MMOL/L (ref 98–107)
CHOLESTEROL, TOTAL: 114 MG/DL (ref 0–199)
CO2: 24 MMOL/L (ref 22–29)
CREAT SERPL-MCNC: 1.2 MG/DL (ref 0.5–1)
EOSINOPHILS ABSOLUTE: 0.21 E9/L (ref 0.05–0.5)
EOSINOPHILS RELATIVE PERCENT: 2.5 % (ref 0–6)
GFR SERPL CREATININE-BSD FRML MDRD: 48 ML/MIN/1.73
GLUCOSE BLD-MCNC: 103 MG/DL (ref 74–99)
HCT VFR BLD CALC: 31.1 % (ref 34–48)
HDLC SERPL-MCNC: 35 MG/DL
HEMOGLOBIN: 10 G/DL (ref 11.5–15.5)
IMMATURE GRANULOCYTES #: 0.04 E9/L
IMMATURE GRANULOCYTES %: 0.5 % (ref 0–5)
IRON SATURATION: 14 % (ref 15–50)
IRON: 34 MCG/DL (ref 37–145)
LACTIC ACID: 1 MMOL/L (ref 0.5–2.2)
LDL CHOLESTEROL CALCULATED: 58 MG/DL (ref 0–99)
LV EF: 50 %
LVEF MODALITY: NORMAL
LYMPHOCYTES ABSOLUTE: 1.48 E9/L (ref 1.5–4)
LYMPHOCYTES RELATIVE PERCENT: 17.7 % (ref 20–42)
MAGNESIUM: 2.2 MG/DL (ref 1.6–2.6)
MCH RBC QN AUTO: 28.1 PG (ref 26–35)
MCHC RBC AUTO-ENTMCNC: 32.2 % (ref 32–34.5)
MCV RBC AUTO: 87.4 FL (ref 80–99.9)
MONOCYTES ABSOLUTE: 1.07 E9/L (ref 0.1–0.95)
MONOCYTES RELATIVE PERCENT: 12.8 % (ref 2–12)
NEUTROPHILS ABSOLUTE: 5.55 E9/L (ref 1.8–7.3)
NEUTROPHILS RELATIVE PERCENT: 66.1 % (ref 43–80)
PDW BLD-RTO: 14.7 FL (ref 11.5–15)
PLATELET # BLD: 221 E9/L (ref 130–450)
PMV BLD AUTO: 9.9 FL (ref 7–12)
POTASSIUM SERPL-SCNC: 3.4 MMOL/L (ref 3.5–5)
RBC # BLD: 3.56 E12/L (ref 3.5–5.5)
SODIUM BLD-SCNC: 143 MMOL/L (ref 132–146)
TOTAL IRON BINDING CAPACITY: 237 MCG/DL (ref 250–450)
TOTAL PROTEIN: 6.2 G/DL (ref 6.4–8.3)
TRIGL SERPL-MCNC: 104 MG/DL (ref 0–149)
TROPONIN, HIGH SENSITIVITY: 31 NG/L (ref 0–9)
VLDLC SERPL CALC-MCNC: 21 MG/DL
WBC # BLD: 8.4 E9/L (ref 4.5–11.5)

## 2023-01-05 PROCEDURE — 80048 BASIC METABOLIC PNL TOTAL CA: CPT

## 2023-01-05 PROCEDURE — 2500000003 HC RX 250 WO HCPCS: Performed by: NURSE PRACTITIONER

## 2023-01-05 PROCEDURE — 2140000000 HC CCU INTERMEDIATE R&B

## 2023-01-05 PROCEDURE — 6360000002 HC RX W HCPCS: Performed by: EMERGENCY MEDICINE

## 2023-01-05 PROCEDURE — 83550 IRON BINDING TEST: CPT

## 2023-01-05 PROCEDURE — 84484 ASSAY OF TROPONIN QUANT: CPT

## 2023-01-05 PROCEDURE — 6370000000 HC RX 637 (ALT 250 FOR IP): Performed by: EMERGENCY MEDICINE

## 2023-01-05 PROCEDURE — 2580000003 HC RX 258: Performed by: NURSE PRACTITIONER

## 2023-01-05 PROCEDURE — 83735 ASSAY OF MAGNESIUM: CPT

## 2023-01-05 PROCEDURE — 6370000000 HC RX 637 (ALT 250 FOR IP): Performed by: NURSE PRACTITIONER

## 2023-01-05 PROCEDURE — 80061 LIPID PANEL: CPT

## 2023-01-05 PROCEDURE — 73110 X-RAY EXAM OF WRIST: CPT

## 2023-01-05 PROCEDURE — 83540 ASSAY OF IRON: CPT

## 2023-01-05 PROCEDURE — 83605 ASSAY OF LACTIC ACID: CPT

## 2023-01-05 PROCEDURE — 2500000003 HC RX 250 WO HCPCS: Performed by: FAMILY MEDICINE

## 2023-01-05 PROCEDURE — 93306 TTE W/DOPPLER COMPLETE: CPT

## 2023-01-05 PROCEDURE — 85025 COMPLETE CBC W/AUTO DIFF WBC: CPT

## 2023-01-05 PROCEDURE — 6360000002 HC RX W HCPCS: Performed by: NURSE PRACTITIONER

## 2023-01-05 PROCEDURE — 80076 HEPATIC FUNCTION PANEL: CPT

## 2023-01-05 RX ORDER — PANTOPRAZOLE SODIUM 40 MG/1
40 TABLET, DELAYED RELEASE ORAL
Status: DISCONTINUED | OUTPATIENT
Start: 2023-01-05 | End: 2023-01-07 | Stop reason: HOSPADM

## 2023-01-05 RX ORDER — POLYETHYLENE GLYCOL 3350 17 G/17G
17 POWDER, FOR SOLUTION ORAL DAILY PRN
Status: DISCONTINUED | OUTPATIENT
Start: 2023-01-05 | End: 2023-01-07 | Stop reason: HOSPADM

## 2023-01-05 RX ORDER — ACETAMINOPHEN 650 MG/1
650 SUPPOSITORY RECTAL EVERY 6 HOURS PRN
Status: DISCONTINUED | OUTPATIENT
Start: 2023-01-05 | End: 2023-01-07 | Stop reason: HOSPADM

## 2023-01-05 RX ORDER — SERTRALINE HYDROCHLORIDE 100 MG/1
100 TABLET, FILM COATED ORAL DAILY
Status: DISCONTINUED | OUTPATIENT
Start: 2023-01-05 | End: 2023-01-07 | Stop reason: HOSPADM

## 2023-01-05 RX ORDER — ROPINIROLE 1 MG/1
1 TABLET, FILM COATED ORAL NIGHTLY
Status: DISCONTINUED | OUTPATIENT
Start: 2023-01-05 | End: 2023-01-05 | Stop reason: ALTCHOICE

## 2023-01-05 RX ORDER — SODIUM CHLORIDE 0.9 % (FLUSH) 0.9 %
5-40 SYRINGE (ML) INJECTION EVERY 12 HOURS SCHEDULED
Status: DISCONTINUED | OUTPATIENT
Start: 2023-01-05 | End: 2023-01-07 | Stop reason: HOSPADM

## 2023-01-05 RX ORDER — ROSUVASTATIN CALCIUM 20 MG/1
40 TABLET, COATED ORAL EVERY MORNING
Status: DISCONTINUED | OUTPATIENT
Start: 2023-01-05 | End: 2023-01-07 | Stop reason: HOSPADM

## 2023-01-05 RX ORDER — PANTOPRAZOLE SODIUM 40 MG/1
40 TABLET, DELAYED RELEASE ORAL
COMMUNITY
Start: 2022-12-18

## 2023-01-05 RX ORDER — POTASSIUM CHLORIDE 20 MEQ/1
40 TABLET, EXTENDED RELEASE ORAL PRN
Status: DISCONTINUED | OUTPATIENT
Start: 2023-01-05 | End: 2023-01-07 | Stop reason: HOSPADM

## 2023-01-05 RX ORDER — SODIUM CHLORIDE 0.9 % (FLUSH) 0.9 %
5-40 SYRINGE (ML) INJECTION PRN
Status: DISCONTINUED | OUTPATIENT
Start: 2023-01-05 | End: 2023-01-07 | Stop reason: HOSPADM

## 2023-01-05 RX ORDER — LEVOTHYROXINE SODIUM 0.1 MG/1
5 TABLET ORAL DAILY
Status: ON HOLD | COMMUNITY
End: 2023-01-05

## 2023-01-05 RX ORDER — ENOXAPARIN SODIUM 100 MG/ML
40 INJECTION SUBCUTANEOUS DAILY
Status: DISCONTINUED | OUTPATIENT
Start: 2023-01-05 | End: 2023-01-07 | Stop reason: HOSPADM

## 2023-01-05 RX ORDER — SODIUM CHLORIDE 9 MG/ML
INJECTION, SOLUTION INTRAVENOUS PRN
Status: DISCONTINUED | OUTPATIENT
Start: 2023-01-05 | End: 2023-01-07 | Stop reason: HOSPADM

## 2023-01-05 RX ORDER — LEVOTHYROXINE SODIUM 0.05 MG/1
50 TABLET ORAL DAILY
Status: DISCONTINUED | OUTPATIENT
Start: 2023-01-06 | End: 2023-01-07 | Stop reason: HOSPADM

## 2023-01-05 RX ORDER — LEVOTHYROXINE SODIUM 0.03 MG/1
12.5 TABLET ORAL DAILY
Status: DISCONTINUED | OUTPATIENT
Start: 2023-01-05 | End: 2023-01-05 | Stop reason: ALTCHOICE

## 2023-01-05 RX ORDER — LEVOTHYROXINE SODIUM 0.05 MG/1
50 TABLET ORAL DAILY
COMMUNITY
Start: 2022-10-17

## 2023-01-05 RX ORDER — ACETAMINOPHEN 325 MG/1
650 TABLET ORAL EVERY 6 HOURS PRN
Status: DISCONTINUED | OUTPATIENT
Start: 2023-01-05 | End: 2023-01-07 | Stop reason: HOSPADM

## 2023-01-05 RX ORDER — BUMETANIDE 0.25 MG/ML
0.5 INJECTION, SOLUTION INTRAMUSCULAR; INTRAVENOUS DAILY
Status: DISCONTINUED | OUTPATIENT
Start: 2023-01-05 | End: 2023-01-05

## 2023-01-05 RX ORDER — BUMETANIDE 0.25 MG/ML
2 INJECTION, SOLUTION INTRAMUSCULAR; INTRAVENOUS 2 TIMES DAILY
Status: DISCONTINUED | OUTPATIENT
Start: 2023-01-05 | End: 2023-01-06

## 2023-01-05 RX ORDER — MAGNESIUM SULFATE IN WATER 40 MG/ML
2000 INJECTION, SOLUTION INTRAVENOUS ONCE
Status: COMPLETED | OUTPATIENT
Start: 2023-01-05 | End: 2023-01-05

## 2023-01-05 RX ORDER — MAGNESIUM SULFATE IN WATER 40 MG/ML
2000 INJECTION, SOLUTION INTRAVENOUS PRN
Status: DISCONTINUED | OUTPATIENT
Start: 2023-01-05 | End: 2023-01-07 | Stop reason: HOSPADM

## 2023-01-05 RX ORDER — POTASSIUM CHLORIDE 7.45 MG/ML
10 INJECTION INTRAVENOUS PRN
Status: DISCONTINUED | OUTPATIENT
Start: 2023-01-05 | End: 2023-01-07 | Stop reason: HOSPADM

## 2023-01-05 RX ORDER — ROPINIROLE 3 MG/1
3 TABLET, FILM COATED ORAL 2 TIMES DAILY
COMMUNITY
Start: 2022-11-21

## 2023-01-05 RX ADMIN — ENOXAPARIN SODIUM 40 MG: 100 INJECTION SUBCUTANEOUS at 07:38

## 2023-01-05 RX ADMIN — Medication 10 ML: at 22:23

## 2023-01-05 RX ADMIN — ROSUVASTATIN CALCIUM 40 MG: 20 TABLET, FILM COATED ORAL at 07:37

## 2023-01-05 RX ADMIN — PANTOPRAZOLE SODIUM 40 MG: 40 TABLET, DELAYED RELEASE ORAL at 07:38

## 2023-01-05 RX ADMIN — SERTRALINE HYDROCHLORIDE 100 MG: 100 TABLET, FILM COATED ORAL at 07:38

## 2023-01-05 RX ADMIN — BUMETANIDE 2 MG: 0.25 INJECTION, SOLUTION INTRAMUSCULAR; INTRAVENOUS at 22:23

## 2023-01-05 RX ADMIN — POTASSIUM CHLORIDE 40 MEQ: 1500 TABLET, EXTENDED RELEASE ORAL at 09:39

## 2023-01-05 RX ADMIN — POTASSIUM CHLORIDE 40 MEQ: 1500 TABLET, EXTENDED RELEASE ORAL at 00:20

## 2023-01-05 RX ADMIN — BUMETANIDE 0.5 MG: 0.25 INJECTION, SOLUTION INTRAMUSCULAR; INTRAVENOUS at 07:41

## 2023-01-05 RX ADMIN — POTASSIUM CHLORIDE 10 MEQ: 10 INJECTION, SOLUTION INTRAVENOUS at 05:21

## 2023-01-05 RX ADMIN — POTASSIUM CHLORIDE 10 MEQ: 10 INJECTION, SOLUTION INTRAVENOUS at 00:19

## 2023-01-05 RX ADMIN — MAGNESIUM SULFATE HEPTAHYDRATE 2000 MG: 40 INJECTION, SOLUTION INTRAVENOUS at 02:46

## 2023-01-05 RX ADMIN — POTASSIUM CHLORIDE 10 MEQ: 10 INJECTION, SOLUTION INTRAVENOUS at 03:21

## 2023-01-05 NOTE — CARE COORDINATION
01/05/2023 SANTOSH Note: Dr Steve Jalloh office notified of consult to evaluate cast. Electronically signed by Ashley Rodgers RN CM on 1/5/2023 at 12:19 PM

## 2023-01-05 NOTE — ACP (ADVANCE CARE PLANNING)
Advance Care Planning   Healthcare Decision Maker:    Primary Decision Maker: Maxwell Spaulding - [facility] - [phone]    Click here to complete Healthcare Decision Makers including selection of the Healthcare Decision Maker Relationship (ie \"Primary\"). Today we documented Decision Maker(s) consistent with Legal Next of Kin hierarchy.        If the relationship to the patient does NOT follow our state's Next of Kin hierarchy, the patient MUST complete an ACP Document to allow him/her to act on the patient's behalf. :

## 2023-01-05 NOTE — H&P
[facility]  History and Physical      CHIEF COMPLAINT:    Chief Complaint   Patient presents with    Shortness of Breath     Recently admitted for diverticulitis, sob for 1 wk        Patient of Daria Morales MD presents with:  Acute congestive heart failure, unspecified heart failure type ([address]    History of Present Illness:   Patient is a 53-year-old female with a past medical history of acid reflux, hyperlipidemia, hypertension, CAD, hypothyroidism. Patient presented to the ED with complaints of shortness of breath that is been ongoing for the past 3 weeks. Patient states her shortness of breath is worse with exertion and she does report some chest pain. Patient has had a productive cough for the last several weeks as well. Patient was recently in the hospital for diverticulitis and acute renal failure. During that admission patient received large volumes of fluid. Patient report increased swelling in her bilateral lower extremities. Patient is alert and oriented on examination. Patient is currently utilizing 2 L O2 for saturation of 96%. Patient denies any chest pain, fever, chills, headache, dizziness, blurred vision, and abdominal pain. ER work-up revealed elevated BNP 30,132, BUN/creatinine 8/1.2, potassium 3.0, troponin 30, chest x-ray CHF. Patient is admitted to telemetry unit for further testing and treatment. REVIEW OF SYSTEMS:  Pertinent negatives are above in HPI. 10 point ROS otherwise negative.       Past Medical History:   Diagnosis Date    Acid reflux     Hyperlipidemia     Hypertension     OCD (obsessive compulsive disorder)     Thyroid disease          Past Surgical History:   Procedure Laterality Date    ANKLE SURGERY Right      SECTION      COLONOSCOPY      ENDOSCOPY, COLON, DIAGNOSTIC      TOTAL KNEE ARTHROPLASTY Left     VOCAL CORD AUGMENTATION W/RADIESSE INJ      WRIST FRACTURE SURGERY Right 12/10/2022    RIGHT WRIST OPEN REDUCTION INTERNAL FIXATION WITH IRRIGATION AND DEBRIDEMENT performed by Vonda Vizcarra MD at [facility] OR       Medications Prior to Admission:    Not in a hospital admission. Note that the patient's home medications were reviewed and the above list is accurate to the best of my knowledge at the time of the exam.    Allergies:    Patient has no known allergies. Social History:    reports that she has never smoked. She has never used smokeless tobacco. She reports that she does not drink alcohol and does not use drugs. Family History:   family history includes Cancer in her brother; Heart Attack (age of onset: 50) in her father. PHYSICAL EXAM:    Vitals:  /81   Pulse 88   Temp 98 °F (36.7 °C) (Oral)   Resp 18   Wt 164 lb (74.4 kg)   SpO2 97%   BMI 30.99 kg/m²       General appearance: NAD, conversant, pleasant  Eyes: Sclerae anicteric, PERRLA  HEENT: AT/NC, MMM  Neck: FROM, supple, no thyromegaly  Lymph: No cervical / supraclavicular lymphadenopathy  Lungs: Clear to auscultation, WOB normal  CV: RRR, no MRGs, bilateral lower extremity edema  Abdomen: Soft, non-tender; no masses or HSM, +BS  Extremities: FROM without synovitis. No clubbing or cyanosis of the hands. Right upper extremity in cast  Skin: no rash, induration, lesions, or ulcers  Psych: Calm and cooperative. Normal judgement and insight. Normal mood and affect. Neuro: Alert and interactive, face symmetric, speech fluent. LABS:  All labs reviewed.   Of note:  CBC with Differential:    Lab Results   Component Value Date/Time    WBC 8.4 01/05/2023 05:31 AM    RBC 3.56 01/05/2023 05:31 AM    HGB 10.0 01/05/2023 05:31 AM    HCT 31.1 01/05/2023 05:31 AM     01/05/2023 05:31 AM    MCV 87.4 01/05/2023 05:31 AM    MCH 28.1 01/05/2023 05:31 AM    MCHC 32.2 01/05/2023 05:31 AM    RDW 14.7 01/05/2023 05:31 AM    LYMPHOPCT 17.7 01/05/2023 05:31 AM    MONOPCT 12.8 01/05/2023 05:31 AM    BASOPCT 0.4 01/05/2023 05:31 AM    MONOSABS 1.07 01/05/2023 05:31 AM    LYMPHSABS 1.48 01/05/2023 05:31 AM    EOSABS 0.21 01/05/2023 05:31 AM    BASOSABS 0.03 01/05/2023 05:31 AM     CMP:    Lab Results   Component Value Date/Time     01/05/2023 05:31 AM    K 3.4 01/05/2023 05:31 AM    K 4.8 12/11/2022 07:39 AM     01/05/2023 05:31 AM    CO2 24 01/05/2023 05:31 AM    BUN 7 01/05/2023 05:31 AM    CREATININE 1.2 01/05/2023 05:31 AM    GFRAA 54 09/06/2019 01:33 PM    LABGLOM 48 01/05/2023 05:31 AM    GLUCOSE 103 01/05/2023 05:31 AM    PROT 6.2 01/05/2023 05:31 AM    LABALBU 3.5 01/05/2023 05:31 AM    CALCIUM 8.1 01/05/2023 05:31 AM    BILITOT 0.4 01/05/2023 05:31 AM    ALKPHOS 105 01/05/2023 05:31 AM    AST 18 01/05/2023 05:31 AM    ALT 9 01/05/2023 05:31 AM       Imaging:  CXR: Bilateral pleural effusion, slightly larger on the left    CTA chest: Findings are suggestive of CHF. No acute pulmonary embolus is identified within limitations. EKG:  I've personally reviewed the patient's EKG:  Normal sinus rhythm    Telemetry:  I've personally reviewed the patient's telemetry:  NSR    ASSESSMENT/PLAN:  Principal Problem:    Acute congestive heart failure, unspecified heart failure type ([address]  Active Problems:    Hypokalemia  Resolved Problems:    * No resolved hospital problems. *    79-year-old female with a history of hyperlipidemia, hypertension presents to the ED with complaints of shortness of breath and is admitted to telemetry unit with    Acute congestive heart failure  -Diurese cautiously so as to avoid renal's insufficiency  -Bumex 2 mg twice daily  -BUN/Creat: 7/1.2  -BNP-30,132  -Daily weights strict I's and O's  -Low-sodium diet  -Supplement O2 to maintain pulse ox duration greater than 93% wean as tolerated  -Echo-pending  Supplement electrolytes-as needed    Right wrist fracture  Consult Ortho-cast removal      Medication for other comorbidities continue as appropriate dose adjustment as necessary.     DVT prophylaxis  PT OT  Discharge planning  Case discussed with attending and agreed upon plan of care. Code status: Full  Requires inpatient level of care  KOLBY Murdock CNP    9:22 AM  1/5/2023     Above note edited to reflect my thoughts   Ms. Margaret Naidu is known to me from recent admission at [facility] at which time she had significant acute renal failure and was on the verge of dialysis, which ultimately was not needed. She did very well as far as her kidneys are concerned, she also was dealing with acute diverticulitis at that time. Secondary to her complaints of chest heaviness followed by left arm pain the next day with acute T wave changes, cardiology was consulted,  She underwent echocardiogram which was negative  She now presents with acute shortness of breath with BNP of greater than 70,000   Continue work-up for volume overload with IV diuretic but with extreme caution given recent renal issues-although her lungs sound clear  Await results of echocardiogram to further direct care-may require more definitive ischemic evaluation with heart cath    I personally saw, examined and provided care for the patient. Radiographs, labs and medication list were reviewed by me independently. The case was discussed in detail and plans for care were established. Review of Corazon LORENZO CNP, documentation was conducted and revisions were made as appropriate directly by me. I agree with the above documented exam, problem list, and plan of care.      Annalee Michelle MD  5:16 PM  1/5/2023

## 2023-01-05 NOTE — ED PROVIDER NOTES
HPI:  23, Time: 9:59 PM ARACELI Negrete is a 81 y.o. female presenting to the ED for shortness of breath, beginning 3 weeks ago. The complaint has been persistent, moderate in severity, and worsened by light exertion. Presenting here because of shortness of breath with exertion she does report some pain in her chest with the shortness of breath. Patient reporting symptoms are worsened with exertion. Patient reporting she has had a productive cough for the last several weeks as well. Patient was recently in the hospital for diverticulitis. Patient currently having no abdominal pain. Patient reporting her legs are swollen. Patient reports no history of heart failure she reports no history of coronary disease. Patient reporting no headache she reports no syncopal event. Patient does have a history of hypertension hyperlipidemia. ROS:   Pertinent positives and negatives are stated within HPI, all other systems reviewed and are negative.  --------------------------------------------- PAST HISTORY ---------------------------------------------  Past Medical History:  has a past medical history of Acid reflux, Hyperlipidemia, Hypertension, OCD (obsessive compulsive disorder), and Thyroid disease. Past Surgical History:  has a past surgical history that includes  section; Vocal Cord Augmentation W/Radiesse Inj; Total knee arthroplasty (Left); Ankle surgery (Right); Endoscopy, colon, diagnostic; Colonoscopy; and Wrist fracture surgery (Right, 12/10/2022). Social History:  reports that she has never smoked. She has never used smokeless tobacco. She reports that she does not drink alcohol and does not use drugs. Family History: family history includes Cancer in her brother; Heart Attack (age of onset: 50) in her father. The patients home medications have been reviewed.     Allergies: Patient has no known allergies. ---------------------------------------------------PHYSICAL EXAM--------------------------------------    Constitutional/General: Alert and oriented x3,   Head: Normocephalic and atraumatic  Eyes: PERRL, EOMI  Mouth: Oropharynx clear, handling secretions, no trismus  Neck: Supple, full ROM, non tender to palpation in the midline, no stridor, no crepitus, no meningeal signs  Pulmonary: Lungs diminished bilaterally mild distress  Cardiovascular:  Regular rate. Regular rhythm. No murmurs, gallops, or rubs. 2+ distal pulses  Chest: no chest wall tenderness  Abdomen: Soft. Non tender. Non distended. +BS. No rebound, guarding, or rigidity. No pulsatile masses appreciated. Musculoskeletal: Moves all extremities x 4. Warm and well perfused, no clubbing, cyanosis, edema lower extremity  Skin: warm and dry. No rashes. Neurologic: GCS 15, CN 2-12 grossly intact, no focal deficits, symmetric strength 5/5 in the upper and lower extremities bilaterally  Psych: Normal Affect    -------------------------------------------------- RESULTS -------------------------------------------------  I have personally reviewed all laboratory and imaging results for this patient. Results are listed below.      LABS:  Results for orders placed or performed during the hospital encounter of 01/04/23   CBC with Auto Differential   Result Value Ref Range    WBC 8.8 4.5 - 11.5 E9/L    RBC 3.73 3.50 - 5.50 E12/L    Hemoglobin 10.4 (L) 11.5 - 15.5 g/dL    Hematocrit 31.7 (L) 34.0 - 48.0 %    MCV 85.0 80.0 - 99.9 fL    MCH 27.9 26.0 - 35.0 pg    MCHC 32.8 32.0 - 34.5 %    RDW 14.7 11.5 - 15.0 fL    Platelets 442 505 - 986 E9/L    MPV 9.8 7.0 - 12.0 fL    Neutrophils % 76.7 43.0 - 80.0 %    Immature Granulocytes % 0.5 0.0 - 5.0 %    Lymphocytes % 13.1 (L) 20.0 - 42.0 %    Monocytes % 8.4 2.0 - 12.0 %    Eosinophils % 1.1 0.0 - 6.0 %    Basophils % 0.2 0.0 - 2.0 %    Neutrophils Absolute 6.75 1.80 - 7.30 E9/L    Immature Granulocytes # 0.04 E9/L    Lymphocytes Absolute 1.15 (L) 1.50 - 4.00 E9/L    Monocytes Absolute 0.74 0.10 - 0.95 E9/L    Eosinophils Absolute 0.10 0.05 - 0.50 E9/L    Basophils Absolute 0.02 0.00 - 0.20 E9/L   Comprehensive Metabolic Panel   Result Value Ref Range    Sodium 145 132 - 146 mmol/L    Potassium 3.0 (L) 3.5 - 5.0 mmol/L    Chloride 109 (H) 98 - 107 mmol/L    CO2 23 22 - 29 mmol/L    Anion Gap 13 7 - 16 mmol/L    Glucose 109 (H) 74 - 99 mg/dL    BUN 8 6 - 23 mg/dL    Creatinine 1.2 (H) 0.5 - 1.0 mg/dL    Est, Glom Filt Rate 48 >=60 mL/min/1.73    Calcium 8.4 (L) 8.6 - 10.2 mg/dL    Total Protein 6.5 6.4 - 8.3 g/dL    Albumin 3.4 (L) 3.5 - 5.2 g/dL    Total Bilirubin 0.4 0.0 - 1.2 mg/dL    Alkaline Phosphatase 107 (H) 35 - 104 U/L    ALT 9 0 - 32 U/L    AST 19 0 - 31 U/L   Troponin   Result Value Ref Range    Troponin, High Sensitivity 30 (H) 0 - 9 ng/L   Brain Natriuretic Peptide   Result Value Ref Range    Pro-BNP 30,132 (H) 0 - 125 pg/mL   Troponin   Result Value Ref Range    Troponin, High Sensitivity 32 (H) 0 - 9 ng/L   Magnesium   Result Value Ref Range    Magnesium 1.4 (L) 1.6 - 2.6 mg/dL   EKG 12 Lead   Result Value Ref Range    Ventricular Rate 91 BPM    Atrial Rate 91 BPM    P-R Interval 134 ms    QRS Duration 58 ms    Q-T Interval 468 ms    QTc Calculation (Bazett) 575 ms    P Axis 51 degrees    R Axis 39 degrees    T Axis -100 degrees       RADIOLOGY:  Interpreted by Radiologist.  CTA CHEST W CONTRAST   Preliminary Result   1. Findings are suggestive of CHF   2. No acute pulmonary embolus is identified within limitations         XR CHEST (2 VW)   Final Result   Bilateral pleural effusion, slightly larger on the left. EKG: This EKG is signed and interpreted by me. Rate: 91  Rhythm: Sinus  Interpretation: non-specific EKG there is noted artifact to EKG.   Comparison: compaired prior EKG from 2019      ------------------------- NURSING NOTES AND VITALS REVIEWED ---------------------------   The nursing notes within the ED encounter and vital signs as below have been reviewed by myself. /75   Pulse 89   Temp 98 °F (36.7 °C) (Oral)   Resp 14   Wt 164 lb (74.4 kg)   SpO2 95%   BMI 30.99 kg/m²   Oxygen Saturation Interpretation: Normal    The patients available past medical records and past encounters were reviewed. ------------------------------ ED COURSE/MEDICAL DECISION MAKING----------------------  Medications   potassium chloride 10 mEq/100 mL IVPB (Peripheral Line) (10 mEq IntraVENous New Bag 1/5/23 0019)   magnesium sulfate 2000 mg in 50 mL IVPB premix (has no administration in time range)   furosemide (LASIX) injection 40 mg (40 mg IntraVENous Given 1/4/23 2226)   potassium chloride (KLOR-CON M) extended release tablet 40 mEq (40 mEq Oral Given 1/5/23 0020)   iopamidol (ISOVUE-370) 76 % injection 75 mL (75 mLs IntraVENous Given 1/4/23 2335)             Medical Decision Making:   Patient with history of shortness of breath and prior history of hyperlipidemia hypertension history of diverticulitis was recently hospitalized for her diverticulitis. Patient presenting here because of productive cough as well as shortness of breath mostly with exertion as well as having some orthopnea. Patient reporting no fever no chills she reports chest pains with her shortness of breath. Patient does report leg swelling. Patient does report history of kidney disease as well. Patient reporting no prior history of heart failure no history of coronary disease she had a recent stress test that was done at the end of December was reportedly normal LV function was normal as well. Patient vital signs noted she is not hypoxic. Differential includes PE CHF coronary artery disease. Patient symptoms I suspect are related to her heart failure. Patient does have edema to her lower extremities. Lungs are essentially clear.   Patient heart exam normal.  I do not appreciate any ST elevation on the EKG there is some mild depression laterally. But there is quite a bit artifact on the initial EKG. Patient was ordered IV Lasix she was also ordered 40 mg IV as well as given K-Dur 40 mill equivalents as well as 310 mill equivalent bags of potassium. Patient will be admitted to intermediate bed I did order CT of her chest due to her complaint of shortness of breath and recent hospitalization. Was reevaluated having no discomfort vital signs stable. Patient was reassessed again a little after 12:25 AM.  Patient does have some burning sensation from the potassium being given IV. Patient was made aware of still awaiting CT report. And she was made aware that there is no available beds at present time. Re-Evaluations:             Reevaluated several times here in the emergency department. Patient made aware of her lab results as well as x-ray findings. Patient did undergo CT of the chest still awaiting results. Patient given IV potassium p.o. as well as IV. Patient also ordered 40 mg of Lasix IV. Patient reporting no chest pain. Consultations:             I spoke to Maximus Gardner who is on-call for internal medicine. Critical Care: This patient's ED course included: a personal history and physicial eaxmination    This patient has been closely monitored during their ED course. Counseling: The emergency provider has spoken with the patient and discussed todays results, in addition to providing specific details for the plan of care and counseling regarding the diagnosis and prognosis. Questions are answered at this time and they are agreeable with the plan.       --------------------------------- IMPRESSION AND DISPOSITION ---------------------------------    IMPRESSION  1. Congestive heart failure, unspecified HF chronicity, unspecified heart failure type ([facility]    2.  Hypokalemia        DISPOSITION  Disposition: Admit to telemetry  Patient condition is stable        NOTE: This report was transcribed using voice recognition software.  Every effort was made to ensure accuracy; however, inadvertent computerized transcription errors may be present          Michael Michaud MD  01/05/23 [id number]       Michael Michaud MD  01/05/23 [id number]

## 2023-01-05 NOTE — CARE COORDINATION
[facility] transition of care. Pt presented to [facility] ER secondary to shortness of breath. Admitted inpatient with CHF. Heart failure nurse, ortho, and dietician on consult. Bumex IV ordered. Pt is currently on supplemental oxygen at 2 L NC. Met with pt at bedside to discuss d/c planning. Per pt Cast removed today. Pt lives with her  and grandchildren in a single story home. Pt reports she is independent with ADLs and an active . Pt has a sc and walker. No hx of HHC or KAITLIN. If oxygen is unable to be weaned pt does not have preference of DME company used. PCP is Dr Jeremías Solano and uses [email or web address] in [facility]. Pt plans to d/c to home when medically stable. Pt  to provide transportation. CM/SW to follow. Eric Damian RN CM. The Plan for Transition of Care is related to the following treatment goals: PLOF    The Patient and/or patient representative  was provided with a choice of provider and agrees   with the discharge plan. [x] Yes [] No    Freedom of choice list was provided with basic dialogue that supports the patient's individualized plan of care/goals, treatment preferences and shares the quality data associated with the providers.  [x] Yes [] No

## 2023-01-05 NOTE — PROGRESS NOTES
Message sent to the ortho resident regarding a brace the patient states she should be getting for her right wrist. Awaiting an answer at this time.

## 2023-01-05 NOTE — CONSULTS
Department of Orthopedic Surgery  Consult Note    Reason for Consult:  S/P Open right distal radius ORIF 12/10     HISTORY OF PRESENT ILLNESS:       Patient is a right-hand-dominant 81 y.o. female who presents following an open right distal radius ORIF on 1/10 by Dr. Jose Bishop. Patient is currently in the emergency department for shortness of breath, respiratory distress and other medical comorbidities. She missed her follow-up appointment in the clinic yesterday . We are consulted for a follow-up visit in house. Patient currently complains of right wrist pain. She does not recall any inciting event. She states that the ain has started since she reported in the ED a day ago. Denies numbness/tingling/paresthesias. Patient lives at home with family. She is an independent ambulator at baseline with no assistive device. Has been nonweightbearing on the right upper extremity. Denies any other orthopedic complaints at this time.       Past Medical History:        Diagnosis Date    Acid reflux     Hyperlipidemia     Hypertension     OCD (obsessive compulsive disorder)     Thyroid disease      Past Surgical History:        Procedure Laterality Date    ANKLE SURGERY Right      SECTION      COLONOSCOPY      ENDOSCOPY, COLON, DIAGNOSTIC      TOTAL KNEE ARTHROPLASTY Left     VOCAL CORD AUGMENTATION W/RADIESSE INJ      WRIST FRACTURE SURGERY Right 12/10/2022    RIGHT WRIST OPEN REDUCTION INTERNAL FIXATION WITH IRRIGATION AND DEBRIDEMENT performed by Alvino Mahoney MD at [facility] OR     Current Medications:   Current Facility-Administered Medications: levothyroxine (SYNTHROID) tablet 12.5 mcg, 12.5 mcg, Oral, Daily  pantoprazole (PROTONIX) tablet 40 mg, 40 mg, Oral, QAM AC  rOPINIRole (REQUIP) tablet 1 mg, 1 mg, Oral, Nightly  rosuvastatin (CRESTOR) tablet 40 mg, 40 mg, Oral, QAM  sertraline (ZOLOFT) tablet 100 mg, 100 mg, Oral, Daily  sodium chloride flush 0.9 % injection 5-40 mL, 5-40 mL, IntraVENous, 2 times per day  sodium chloride flush 0.9 % injection 5-40 mL, 5-40 mL, IntraVENous, PRN  0.9 % sodium chloride infusion, , IntraVENous, PRN  polyethylene glycol (GLYCOLAX) packet 17 g, 17 g, Oral, Daily PRN  acetaminophen (TYLENOL) tablet 650 mg, 650 mg, Oral, Q6H PRN **OR** acetaminophen (TYLENOL) suppository 650 mg, 650 mg, Rectal, Q6H PRN  enoxaparin (LOVENOX) injection 40 mg, 40 mg, SubCUTAneous, Daily  potassium chloride (KLOR-CON M) extended release tablet 40 mEq, 40 mEq, Oral, PRN **OR** potassium bicarb-citric acid (EFFER-K) effervescent tablet 40 mEq, 40 mEq, Oral, PRN **OR** potassium chloride 10 mEq/100 mL IVPB (Peripheral Line), 10 mEq, IntraVENous, PRN  magnesium sulfate 2000 mg in 50 mL IVPB premix, 2,000 mg, IntraVENous, PRN  perflutren lipid microspheres (DEFINITY) injection 1.5 mL, 1.5 mL, IntraVENous, ONCE PRN  bumetanide (BUMEX) injection 2 mg, 2 mg, IntraVENous, BID  Allergies:  Patient has no known allergies. Social History:   TOBACCO:   reports that she has never smoked. She has never used smokeless tobacco.  ETOH:   reports no history of alcohol use. DRUGS:   reports no history of drug use.   ACTIVITIES OF DAILY LIVING:    OCCUPATION:    Family History:       Problem Relation Age of Onset    Heart Attack Father 50            Cancer Brother        REVIEW OF SYSTEMS:  CONSTITUTIONAL:  negative for acute  fevers, chills  EYES:  negative for acute blurred vision, visual disturbance  HEENT:  negative for  acute hearing loss, voice change  RESPIRATORY:  negative for acute  dyspnea, wheezing  CARDIOVASCULAR:  negative for  acute chest pain, palpitations  GASTROINTESTINAL:  negative for acute nausea, vomiting  HEMATOLOGIC/LYMPHATIC:  negative for acute bleeding and petechiae  MUSCULOSKELETAL:  See HPI  NEUROLOGICAL:  negative for acute headaches, dizziness  BEHAVIOR/PSYCH:  negative for acute increased agitation and anxiety    PHYSICAL EXAM:    VITALS:  /74   Pulse 98   Temp 98 °F (36.7 °C) (Oral)   Resp 16   Wt 164 lb (74.4 kg)   SpO2 98%   BMI 30.99 kg/m²   CONSTITUTIONAL:  awake, alert, cooperative, in apparent distress, and appears stated age  MUSCULOSKELETAL:  Right upper Extremity:  Splint in place, removed. Sutures in place  Healing surgical wound with no signs of dehiscence, erythema or any signs of active infection.  Moderate swelling present over volar forearm  Tenderness to palpation volar/dorsal forearm  Apartment soft and compressible +2/4 radial pulse, cap refill less than 3 seconds, skin warm and well perfused  Patient able to flex/extend wrist, MCP/PIP/DIP of all digits  Intact sensation over digital nerve distribution, radial and ulnar/PIN/AIN nerve distribution    Secondary Exam:   leftUE: No obvious signs of trauma.  -TTP to fingers, hand, wrist, forearm, elbow, humerus, shoulder or clavicle.-- Patient able to flex/extend fingers, wrist, elbow and shoulder with active and passive ROM without pain, +2/4 Radial pulse, cap refill <3sec, +AIN/PIN/Radial/Ulnar/Median N, distal sensation grossly intact to C4-T1 dermatomes, compartments soft and compressible.    bilateralLE: No obvious signs of trauma.   -TTP to foot, ankle, leg, knee, thigh, hip.-- Patient able to flex/extend toes, ankle, knee and hip with active and passive ROM without pain,+2/4 DP & PT pulses, cap refill <3sec, +5/5 PF/DF/EHL, distal sensation grossly intact to L4-S1 dermatomes, compartments soft and compressible.    Pelvis: -TTP, -Log roll, -Heel strike     DATA:    CBC:   Lab Results   Component Value Date/Time    WBC 8.4 01/05/2023 05:31 AM    RBC 3.56 01/05/2023 05:31 AM    HGB 10.0 01/05/2023 05:31 AM    HCT 31.1 01/05/2023 05:31 AM    MCV 87.4 01/05/2023 05:31 AM    MCH 28.1 01/05/2023 05:31 AM    MCHC 32.2 01/05/2023 05:31 AM    RDW 14.7 01/05/2023 05:31 AM     01/05/2023 05:31 AM    MPV 9.9 01/05/2023 05:31 AM     PT/INR:    Lab Results   Component Value Date/Time    PROTIME 10.7 12/10/2022  05:37 AM    INR 1.0 12/10/2022 05:37 AM       Radiology Review:  X-ray right wrist demonstrate s/p distal radius ORIF with plate and screws.  No subsidence, screw pullout or hardware failure noted compared to postop films.  Moderate callus formation noted on previous fracture site.    IMPRESSION:  SP right distal radius ORIF right 12/10    PLAN:  Weightbearing as tolerated in right upper extremity  Sutures removed, Ace bandage applied, Wrist brace applied   Okay to start active range of motion in the right wrist with physical therapy  Pain control  DVT Prophylaxis  No acute orthopedic intervention at this time patient will follow outpatient for a follow-up visit in 4 weeks for reassessment and new x-rays  Discussed with attending    Manohar Kennedy DO  Orthopaedic Surgery Resident PGY-1

## 2023-01-05 NOTE — ED NOTES
Patient sitting in waiting area, vitals retaken, patient  updated on labs, diagnostic testing and room status. Another warm blanket provided to patient no s/sx of distress, zero complaints of pain or discomfort at this time. Will continue to monitor.     Yanely Angel LPN  01/04/23 2036

## 2023-01-06 ENCOUNTER — CLINICAL DOCUMENTATION (OUTPATIENT)
Dept: ORTHOPEDIC SURGERY | Age: 73
End: 2023-01-06

## 2023-01-06 LAB
ANION GAP SERPL CALCULATED.3IONS-SCNC: 14 MMOL/L (ref 7–16)
BUN BLDV-MCNC: 8 MG/DL (ref 6–23)
CALCIUM SERPL-MCNC: 8.9 MG/DL (ref 8.6–10.2)
CHLORIDE BLD-SCNC: 106 MMOL/L (ref 98–107)
CO2: 24 MMOL/L (ref 22–29)
CREAT SERPL-MCNC: 1.2 MG/DL (ref 0.5–1)
GFR SERPL CREATININE-BSD FRML MDRD: 48 ML/MIN/1.73
GLUCOSE BLD-MCNC: 151 MG/DL (ref 74–99)
MAGNESIUM: 1.7 MG/DL (ref 1.6–2.6)
POTASSIUM SERPL-SCNC: 3.1 MMOL/L (ref 3.5–5)
SODIUM BLD-SCNC: 144 MMOL/L (ref 132–146)

## 2023-01-06 PROCEDURE — 2500000003 HC RX 250 WO HCPCS: Performed by: FAMILY MEDICINE

## 2023-01-06 PROCEDURE — 2140000000 HC CCU INTERMEDIATE R&B

## 2023-01-06 PROCEDURE — 97161 PT EVAL LOW COMPLEX 20 MIN: CPT

## 2023-01-06 PROCEDURE — 36415 COLL VENOUS BLD VENIPUNCTURE: CPT

## 2023-01-06 PROCEDURE — 97535 SELF CARE MNGMENT TRAINING: CPT

## 2023-01-06 PROCEDURE — 6370000000 HC RX 637 (ALT 250 FOR IP): Performed by: FAMILY MEDICINE

## 2023-01-06 PROCEDURE — 2500000003 HC RX 250 WO HCPCS: Performed by: INTERNAL MEDICINE

## 2023-01-06 PROCEDURE — 97165 OT EVAL LOW COMPLEX 30 MIN: CPT

## 2023-01-06 PROCEDURE — 6360000002 HC RX W HCPCS: Performed by: NURSE PRACTITIONER

## 2023-01-06 PROCEDURE — 6370000000 HC RX 637 (ALT 250 FOR IP): Performed by: NURSE PRACTITIONER

## 2023-01-06 PROCEDURE — 2580000003 HC RX 258: Performed by: NURSE PRACTITIONER

## 2023-01-06 PROCEDURE — 97530 THERAPEUTIC ACTIVITIES: CPT

## 2023-01-06 PROCEDURE — 83735 ASSAY OF MAGNESIUM: CPT

## 2023-01-06 PROCEDURE — 80048 BASIC METABOLIC PNL TOTAL CA: CPT

## 2023-01-06 RX ORDER — HYDROCODONE BITARTRATE AND HOMATROPINE METHYLBROMIDE ORAL SOLUTION 5; 1.5 MG/5ML; MG/5ML
5 LIQUID ORAL EVERY 4 HOURS PRN
Status: DISCONTINUED | OUTPATIENT
Start: 2023-01-06 | End: 2023-01-07 | Stop reason: HOSPADM

## 2023-01-06 RX ORDER — BUMETANIDE 0.25 MG/ML
1 INJECTION, SOLUTION INTRAMUSCULAR; INTRAVENOUS 2 TIMES DAILY
Status: DISCONTINUED | OUTPATIENT
Start: 2023-01-06 | End: 2023-01-07 | Stop reason: HOSPADM

## 2023-01-06 RX ADMIN — Medication 10 ML: at 10:33

## 2023-01-06 RX ADMIN — ROPINIROLE HYDROCHLORIDE 3 MG: 2 TABLET, FILM COATED ORAL at 07:56

## 2023-01-06 RX ADMIN — ROPINIROLE HYDROCHLORIDE 3 MG: 2 TABLET, FILM COATED ORAL at 20:29

## 2023-01-06 RX ADMIN — ROSUVASTATIN CALCIUM 40 MG: 20 TABLET, FILM COATED ORAL at 07:56

## 2023-01-06 RX ADMIN — PANTOPRAZOLE SODIUM 40 MG: 40 TABLET, DELAYED RELEASE ORAL at 05:16

## 2023-01-06 RX ADMIN — ACETAMINOPHEN 650 MG: 325 TABLET ORAL at 11:46

## 2023-01-06 RX ADMIN — ENOXAPARIN SODIUM 40 MG: 100 INJECTION SUBCUTANEOUS at 07:59

## 2023-01-06 RX ADMIN — BUMETANIDE 1 MG: 0.25 INJECTION, SOLUTION INTRAMUSCULAR; INTRAVENOUS at 20:29

## 2023-01-06 RX ADMIN — POTASSIUM CHLORIDE 40 MEQ: 1500 TABLET, EXTENDED RELEASE ORAL at 11:41

## 2023-01-06 RX ADMIN — SERTRALINE HYDROCHLORIDE 100 MG: 100 TABLET, FILM COATED ORAL at 07:56

## 2023-01-06 RX ADMIN — LEVOTHYROXINE SODIUM 50 MCG: 0.05 TABLET ORAL at 05:16

## 2023-01-06 RX ADMIN — Medication 10 ML: at 20:29

## 2023-01-06 RX ADMIN — BUMETANIDE 2 MG: 0.25 INJECTION, SOLUTION INTRAMUSCULAR; INTRAVENOUS at 07:56

## 2023-01-06 ASSESSMENT — PAIN DESCRIPTION - ORIENTATION: ORIENTATION: RIGHT

## 2023-01-06 ASSESSMENT — PAIN DESCRIPTION - DESCRIPTORS: DESCRIPTORS: GNAWING

## 2023-01-06 ASSESSMENT — PAIN - FUNCTIONAL ASSESSMENT: PAIN_FUNCTIONAL_ASSESSMENT: PREVENTS OR INTERFERES SOME ACTIVE ACTIVITIES AND ADLS

## 2023-01-06 ASSESSMENT — PAIN DESCRIPTION - LOCATION: LOCATION: BACK

## 2023-01-06 NOTE — PLAN OF CARE
Patient's chart updated to reflect:      . - HF care plan, HF education points and HF discharge instructions.  -Orders: 2 gram sodium diet, daily weights, I/O.  -PCP and cardiology follow up appointments to be scheduled within 7 days of hospital discharge. -CHF education session will be provided to the patient prior to hospital discharge.     César Jaquez RN RN, BSN  Heart Failure Navigator

## 2023-01-06 NOTE — PATIENT CARE CONFERENCE
P Quality Flow/Interdisciplinary Rounds Progress Note        Quality Flow Rounds held on January 6, 2023    Disciplines Attending:  Bedside Nurse, , , and Nursing Unit Leadership    Tresa Fernando was admitted on 1/4/2023  9:57 PM    Anticipated Discharge Date:       Disposition:    Fortino Score:  Fortino Scale Score: 17    Readmission Risk              Risk of Unplanned Readmission:  20           Discussed patient goal for the day, patient clinical progression, and barriers to discharge.   The following Goal(s) of the Day/Commitment(s) have been identified:  Diagnostics - Report Results      Heidi Garcia RN  January 6, 2023
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
## 2023-01-06 NOTE — PLAN OF CARE
Problem: Chronic Conditions and Co-morbidities  Goal: Patient's chronic conditions and co-morbidity symptoms are monitored and maintained or improved  Outcome: Progressing  Flowsheets (Taken 1/5/2023 2001)  Care Plan - Patient's Chronic Conditions and Co-Morbidity Symptoms are Monitored and Maintained or Improved: Monitor and assess patient's chronic conditions and comorbid symptoms for stability, deterioration, or improvement     Problem: Discharge Planning  Goal: Discharge to home or other facility with appropriate resources  Outcome: Progressing  Flowsheets (Taken 1/5/2023 2001)  Discharge to home or other facility with appropriate resources: Identify barriers to discharge with patient and caregiver     Problem: Safety - Adult  Goal: Free from fall injury  Outcome: Progressing     Problem: ABCDS Injury Assessment  Goal: Absence of physical injury  Outcome: Progressing

## 2023-01-06 NOTE — PROGRESS NOTES
Nutrition Education        Provided educational handouts and sample meal plans on cardiac/heart healthy diet. Information in discharge instructions. Recommend outpatient nutrition counseling for further education.          Josh Caldera RD, LD  Contact Number: [phone]

## 2023-01-06 NOTE — CARE COORDINATION
IV bumex 2mg twice a day. Met with pt in room. O2 at 2l/nc. Plan remains to home at discharge. Voiced no needs for home at present. No preference for dme agency if home o2 is needed. Sw/cm will follow.

## 2023-01-06 NOTE — DISCHARGE INSTR - DIET
Good nutrition is important when healing from an illness, injury, or surgery. Follow any nutrition recommendations given to you during your hospital stay. If you were given an oral nutrition supplement while in the hospital, continue to take this supplement at home. You can take it with meals, in-between meals, and/or before bedtime. These supplements can be purchased at most local grocery stores, pharmacies, and chain [employer]-stores. If you have any questions about your diet or nutrition, call the hospital and ask for the dietitian. Eating less sodium can help you if you have high blood pressure, heart failure, or kidney or liver disease. Your body needs a little sodium, but too much sodium can cause your body to hold onto extra water. This extra water will raise your blood pressure and can cause damage to your heart, kidneys, or liver as they are forced to work harder. Sometimes you can see how the extra fluid affects you because your hands, legs, or belly swell. You may also hold water around your heart and lungs, which makes it hard to breathe. Even if you take medication for blood pressure or a water pill (diuretic) to remove fluid, it is still important to have less salt in your diet. Check with your primary care provider before drinking alcohol since it may affect the amount of fluid in your body and how your heart, kidneys, or liver work. Sodium in Food  A low-sodium meal plan limits the sodium that you get from food and beverages to 1,500-2,300 milligrams (mg) per day. Salt is the main source of sodium. Read the nutrition label on the package to find out how much sodium is in one serving of a food. Select foods with 140 milligrams (mg) of sodium or less per serving. You may be able to eat one or two servings of foods with a little more than 140 milligrams (mg) of sodium if you are closely watching how much sodium you eat in a day. Check the serving size on the label.  The amount of sodium listed on the label shows the amount in one serving of the food. So, if you eat more than one serving, you will get more sodium than the amount listed. Tips  Cutting Back on Sodium  Eat more fresh foods. Fresh fruits and vegetables are low in sodium, as well as frozen vegetables and fruits that have no added juices or sauces. Fresh meats are lower in sodium than processed meats, such as de leon, sausage, and hotdogs. Not all processed foods are unhealthy, but some processed foods may have too much sodium. Eat less salt at the table and when cooking. One of the ingredients in salt is sodium. One teaspoon of table salt has 2,300 milligrams of sodium. Leave the salt out of recipes for pasta, casseroles, and soups. Be a smart . Food packages that say Salt-free, sodium-free, very low sodium, and low sodium have less than 140 milligrams of sodium per serving. Beware of products identified as Unsalted, No Salt Added, Reduced Sodium, or Lower Sodium.  These items may still be high in sodium. You should always check the nutrition label. Add flavors to your food without adding sodium. Try lemon juice, lime juice, or vinegar. Dry or fresh herbs add flavor. Buy a sodium-free seasoning blend or make your own at home. You can purchase salt-free or sodium-free condiments like barbeque sauce in stores and online. Ask your registered dietitian nutritionist for recommendations and where to find them. Eating in Restaurants  Choose foods carefully when you eat outside your home. Restaurant foods can be very high in sodium. Many restaurants provide nutrition facts on their menus or their websites. If you cannot find that information, ask your . Let your  know that you want your food to be cooked without salt and that you would like your salad dressing and sauces to be served on the side.             Foods to Choose or to Limit    Food Group    Foods to Choose    Foods to Limit    Grains    Bread and rolls without salted tops    Homemade bread made with reduced-sodium baking powder    Cold cereals, especially shredded wheat and puffed rice    Oats, grits, or cream of wheat    Pastas, quinoa, and rice    Popcorn, pretzels or crackers without salt    Corn tortillas    Breads or crackers topped with salt    Cereals (hot/cold) with more than 300 mg sodium per serving    Biscuits, cornbread, and other quick breads prepared with baking soda    Pre-packaged bread crumbs    Seasoned and packaged rice and pasta mixes    Self-rising flours    Protein Foods    Fresh meats and fish (check the nutrition labels - make sure they are not packaged in a sodium solution)    Canned or packed tuna (no more than 4 ounces at 1 serving)    Beans and peas    Soybeans and tofu    Eggs    Nuts or nut butters without salt    Sees and seed butters without salt    Cured meats: Frankel, ham, sausage, pepperoni and hot dogs    Canned meats (chili, justus sausage, or sardines)    Smoked fish and meats    Egg substitute (with added sodium)    Dairy and Dairy Alternatives    Milk or milk powder    Plant milks, such as rice and soy    Yogurt, including Greek yogurt    Small amounts of natural cheese (blocks of cheese) or reduced-sodium cheese can be used in moderation.  (Swiss, ricotta, and fresh mozzarella cheese are lower in sodium than the others)    Cream cheese    Low sodium cottage cheese    Buttermilk    Processed cheese spreads    Cottage cheese (1 cup may have over 500 mg of sodium; look for low-sodium)    American or feta cheese    Shredded cheese has more sodium than blocks of cheese    String cheese    Vegetables    Fresh, frozen, and canned vegetables without added sauces or salt    Canned vegetables (unless they are salt-free, sodium-free or low sodium)    Frozen vegetables with seasoning and sauces    Sauerkraut and pickled vegetables    French fries and onion rings    Fruit    Fresh, frozen, and canned fruits    Dried fruits, such as raisins, cranberries, and prunes    Dried fruits preserved with additives that have sodium    Oils    Tub or liquid margarine, regular or without salt    Canola, corn, peanut, olive, safflower, or sunflower oils    Salted butter or margarine, all types of olives    Condiments    Fresh or dried herbs such as basil, bay leaf, dill, mustard (dry), nutmeg, paprika, parsley, rosemary, ranjit, or thyme. Low sodium ketchup    Vinegar                                     Lemon or lime juice    Pepper, red pepper flakes, and cayenne. Hot sauce contains sodium, but if you use just a drop or two, it will not add up to much. Salt-free or sodium-free seasoning mixes and marinades    Simple salad dressings: vinegar and oil    Salt, sea salt, kosher salt, onion salt, and garlic salt    Seasoning mixes with salt    Bouillon cubes    Ketchup    Barbeque sauce and Worcestershire sauce unless low sodium    Soy sauce    Salsa, relish    Salad dressings: ranch, blue cheese, [place], and Western Brianne.     Other    Homemade soups (without salt)    Low-sodium, salt-free or sodium-free canned soups    Canned or dried soups (unless they are salt-free, sodium-free, or low  sodium)    Frozen meals that have more than 600 mg of sodium per serving    Low-Sodium Sample 1-Day Menu   Breakfast  1 cup cooked oatmeal  1 slice whole wheat bread toast  1 tablespoon peanut butter without salt  1 banana  1 cup 1% milk  Lunch  Tacos made with: 2 corn tortillas  ¼ cup black beans, low sodium  ½ cup roasted or grilled chicken (without skin)  ¼ avocado  Squeeze of lime juice  1 cup salad greens  1 tablespoon low-sodium salad dressing  ¼ cup strawberries  1 orange  Afternoon Snack  1/3 cup grapes  ¾ cup yogurt  Evening Meal  3 ounces herb-baked fish  1 baked potato  2 teaspoons olive oil  ½ cup cooked carrots  2 thick slices tomatoes on:  2 lettuce leaves  1 teaspoon olive oil  1 teaspoon balsamic vinegar  1 cup 1% milk  Evening Snack  1 apple  ¼ cup almonds without salt  Daily Sum  Nutrient Unit Value  Macronutrients  Energy kcal 2026  Energy kJ 8492  Protein g 107  Total lipid (fat) g 70  Carbohydrate, by difference g 262  Fiber, total dietary g 36  Sugars, total g 103  Minerals  Calcium, Ca mg 1378  Iron, Fe mg 13  Sodium, Na mg 894  Vitamins  Vitamin C, total ascorbic acid mg 163  Vitamin A, IU IU 55326  Vitamin D   Lipids  Fatty acids, total saturated g 14  Fatty acids, total monounsaturated g 36  Fatty acids, total polyunsaturated g 15  Cholesterol mg 154  Low-Sodium Vegan Sample 1-Day Menu   Breakfast  1 cup cooked oatmeal  1 tablespoon peanut butter without salt  1 cup blueberries  1 cup soymilk fortified with calcium, vitamin B12, and vitamin D  Lunch  1 small whole wheat rajan  ½ cup cooked lentils  2 tablespoons hummus  4 carrot sticks  1 medium apple  1 cup soymilk fortified with calcium, vitamin B12, and vitamin D  Evening Meal  Stir elias made with: ½ cup tofu  1 cup brown rice  ½ cup broccoli  ½ cup green beans  ½ cup peppers  ½ tablespoon peanut oil  1 cup cantaloupe  Evening Snack  1 cup soy yogurt  ¼ cup mixed nuts  Daily Sum  Nutrient Unit Value  Macronutrients  Energy kcal 1725  Energy kJ 7217  Protein g 73  Total lipid (fat) g 61  Carbohydrate, by difference g 241  Fiber, total dietary g 41  Sugars, total g 74  Minerals  Calcium, Ca mg 1358  Iron, Fe mg 16  Sodium, Na mg 583  Vitamins  Vitamin C, total ascorbic acid mg 221  Vitamin A, IU IU 52299  Vitamin D   Lipids  Fatty acids, total saturated g 10  Fatty acids, total monounsaturated g 25  Fatty acids, total polyunsaturated g 19  Cholesterol mg 0  Low-Sodium Vegetarian (Lacto-Ovo) Sample 1-Day Menu   Breakfast  1 cup cooked oatmeal  1 slice whole wheat toast  1 tablespoon peanut butter without salt  1 banana  1 cup 1% milk  Lunch  Tacos made with: 2 corn tortillas  ½ cup black beans, low sodium  ¼ avocado  Squeeze of lime juice  1 cup salad greens  1 tablespoon low-sodium salad dressing  ¼ cup strawberries  1 orange  Evening Meal  Stir elias made with: ½ cup tofu  1 cup brown rice  ½ cup broccoli  ½ cup green beans  ½ cup peppers  ½ tablespoon peanut oil  1 orange  1 cup 1% milk  Evening Snack  4 strips celery  2 tablespoons hummus  1 hard-boiled egg  Daily Sum  Nutrient Unit Value  Macronutrients  Energy kcal 1722  Energy kJ 7212  Protein g 75  Total lipid (fat) g 55  Carbohydrate, by difference g 251  Fiber, total dietary g 43  Sugars, total g 78  Minerals  Calcium, Ca mg 1299  Iron, Fe mg 14  Sodium, Na mg 816  Vitamins  Vitamin C, total ascorbic acid mg 292  Vitamin A, IU IU 8059  Vitamin D   Lipids  Fatty acids, total saturated g 13  Fatty acids, total monounsaturated g 22  Fatty acids, total polyunsaturated g 15  Cholesterol mg 221

## 2023-01-06 NOTE — DISCHARGE INSTRUCTIONS
HEART FAILURE  / CONGESTIVE HEART FAILURE  DISCHARGE INSTRUCTIONS:  GUIDELINES TO FOLLOW AT HOME    Self- Managed Care:     MEDICATIONS:  Take your medication as directed. If you are experiencing any side effects, inform your doctor, Do not stop taking any of your medications without letting your doctor know. Check with your doctor before taking any over-the-counter medications / herbal / or dietary supplements. They may interfere with your other medications. Do not take ibuprofen (Advil or Motrin) and naproxen (Aleve) without talking to your doctor first. They could make your heart failure worse. WEIGHT MONITORING:   Weigh yourself everyday (with the same scale) around the same time of the day and write it down. (you can chart them on a calendar or keep track of them on paper. Notify your doctor of a weight gain of 3 pounds or more in 1 day   OR a total of 5 pounds or more in 1 week    Take your weight record to your doctor visits  Also, the same goes if you loose more than 3# in one day, let your heart doctor know. DIET:   Cardiac heart healthy diet- Low saturated / low trans fat, no added salt, caffeine restricted, Low sodium diet-   No more than 2,000mg (2 grams) of salt / sodium per day (which equals to a little less than  a teaspoon of salt)  If your doctor wants you on a fluid restriction. ..it is usually recommended a fluid limit of 2,000cc -  Fluid restriction- 2,000 ml (milliliters) = 64 ounces = you can have 8 glasses of fluid per day (each glass 8 ounces)    Follow a low salt diet - avoid using salt at the table, avoid / limit use of canned soups, processed / packaged foods, salted snacks, olives and pickles. Do not use a salt substitute without checking with your doctor, they may contain a high amount of potassioum. (Mrs. Sera Borden is safe to use).     Limit the use of alcohol       CALL YOUR DOCTOR THE FIRST DAY YOU NOTICE ANY OF THESE   SYMPTOMS:  You have a weight gain of 3 pounds or more in 1 day         OR 5 pounds or more in one week  More shortness of breath  More swelling of your stomach, legs, ankles or feet  Feeling more tired, No energy  Dry hacky cough  Dizziness  More chest pain / discomfort       (CALL 911 IF ANY OF THE FOLLOWING OCCURS  Chest pain (not relieved with nitroglycerine, if you have been prescribed this medication)  Severe shortness of breath  Faint / Pass out  Confusion / cannot think clearly  If symptoms get worse           SMOKING - TOBACCO USE:  * IF YOU SMOKE - STOP! Kick the habit. [address] Program is offered at [facility] [address] and [address] [facility]. Call [phone] extension [phone] for more information. ACTIVITY:   (Ask your doctor when you will be able to return to work and before starting any exercise program.  Do not drive unless unless your doctor has given you permission to do so). Start light exercise. Even if you can only do a small amount, exercise will help you get stronger, have more energy, help manage your weight and decrease  stress. Walking is an easy way to get exercise. Start out slowly and  increase the amount you walk as tolerated  If you become short of breath, dizzy or have chest pain; stop, sit down, and rest.  If you feel \"wiped out\" the day after you exercise, walk at a slower pace or for a shorter distance. You can gradually increase the pace or amount of time. (Do not exercise right after a meal or in extreme temperatures, such as above 85 degrees, if the air is really humid, or wind chill is less than 20 degrees)                                             ADDITIONAL INFORMATION:  Avoid getting sick from colds and the flu. Stay away from friends or family that you know may have a contagious illness  Get plenty of rest   Get a flu shot each year. Get a pneumococcal vaccine shot.  If you have had one before, ask your doctor whether you need another dose. My Goal for Self-management of Heart Failure Includes 5 steps :    1. Notice a change in symptoms ( weight gain, short of breath, leg swelling, decreased activity level, bloating. ...)    2. Evaluate the change: (use the Heart Failure Zones )     3. Decide to take action: decide what your options are, such as: (call your doctor for an extra visit, take a prescribed medication, such as your water pill if your doctor has given you directions to do so, [address])    4. Come up with a strategy:  (now you call the doctor for advice / appointment. This is where you take action!!! Do not wait, catch the symptom early and treat it before it worsens. 5. Evaluate the response: The next day, check your Heart Failure Zones: are you in the GREEN ZONE (safe zone)? Worsening symptoms of YELLOW ZONE? Or have you moved to the RED ZONE and need to call 911 or go to the Emergency Room for evaluation? Call your doctor's office to update them on your symptoms of heart failure.
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
## 2023-01-07 VITALS
WEIGHT: 141.2 LBS | HEART RATE: 106 BPM | HEIGHT: 61 IN | SYSTOLIC BLOOD PRESSURE: 127 MMHG | DIASTOLIC BLOOD PRESSURE: 58 MMHG | RESPIRATION RATE: 18 BRPM | TEMPERATURE: 96.8 F | BODY MASS INDEX: 26.66 KG/M2 | OXYGEN SATURATION: 93 %

## 2023-01-07 LAB
ADENOVIRUS BY PCR: NOT DETECTED
ANION GAP SERPL CALCULATED.3IONS-SCNC: 17 MMOL/L (ref 7–16)
BORDETELLA PARAPERTUSSIS BY PCR: NOT DETECTED
BORDETELLA PERTUSSIS BY PCR: NOT DETECTED
BUN BLDV-MCNC: 9 MG/DL (ref 6–23)
CALCIUM SERPL-MCNC: 9.1 MG/DL (ref 8.6–10.2)
CHLAMYDOPHILIA PNEUMONIAE BY PCR: NOT DETECTED
CHLORIDE BLD-SCNC: 103 MMOL/L (ref 98–107)
CO2: 20 MMOL/L (ref 22–29)
CORONAVIRUS 229E BY PCR: NOT DETECTED
CORONAVIRUS HKU1 BY PCR: NOT DETECTED
CORONAVIRUS NL63 BY PCR: NOT DETECTED
CORONAVIRUS OC43 BY PCR: NOT DETECTED
CREAT SERPL-MCNC: 1.2 MG/DL (ref 0.5–1)
GFR SERPL CREATININE-BSD FRML MDRD: 48 ML/MIN/1.73
GLUCOSE BLD-MCNC: 114 MG/DL (ref 74–99)
HUMAN METAPNEUMOVIRUS BY PCR: NOT DETECTED
HUMAN RHINOVIRUS/ENTEROVIRUS BY PCR: DETECTED
INFLUENZA A BY PCR: NOT DETECTED
INFLUENZA B BY PCR: NOT DETECTED
MAGNESIUM: 1.9 MG/DL (ref 1.6–2.6)
MYCOPLASMA PNEUMONIAE BY PCR: NOT DETECTED
PARAINFLUENZA VIRUS 1 BY PCR: NOT DETECTED
PARAINFLUENZA VIRUS 2 BY PCR: NOT DETECTED
PARAINFLUENZA VIRUS 3 BY PCR: NOT DETECTED
PARAINFLUENZA VIRUS 4 BY PCR: NOT DETECTED
POTASSIUM SERPL-SCNC: 4 MMOL/L (ref 3.5–5)
PRO-BNP: 6873 PG/ML (ref 0–125)
RESPIRATORY SYNCYTIAL VIRUS BY PCR: NOT DETECTED
SARS-COV-2, PCR: NOT DETECTED
SODIUM BLD-SCNC: 140 MMOL/L (ref 132–146)

## 2023-01-07 PROCEDURE — 94640 AIRWAY INHALATION TREATMENT: CPT

## 2023-01-07 PROCEDURE — 2500000003 HC RX 250 WO HCPCS: Performed by: INTERNAL MEDICINE

## 2023-01-07 PROCEDURE — 6370000000 HC RX 637 (ALT 250 FOR IP): Performed by: NURSE PRACTITIONER

## 2023-01-07 PROCEDURE — 83735 ASSAY OF MAGNESIUM: CPT

## 2023-01-07 PROCEDURE — 36415 COLL VENOUS BLD VENIPUNCTURE: CPT

## 2023-01-07 PROCEDURE — 80048 BASIC METABOLIC PNL TOTAL CA: CPT

## 2023-01-07 PROCEDURE — 0202U NFCT DS 22 TRGT SARS-COV-2: CPT

## 2023-01-07 PROCEDURE — 2580000003 HC RX 258: Performed by: NURSE PRACTITIONER

## 2023-01-07 PROCEDURE — 6370000000 HC RX 637 (ALT 250 FOR IP): Performed by: INTERNAL MEDICINE

## 2023-01-07 PROCEDURE — 6360000002 HC RX W HCPCS: Performed by: NURSE PRACTITIONER

## 2023-01-07 PROCEDURE — 6370000000 HC RX 637 (ALT 250 FOR IP): Performed by: FAMILY MEDICINE

## 2023-01-07 PROCEDURE — 83880 ASSAY OF NATRIURETIC PEPTIDE: CPT

## 2023-01-07 RX ORDER — HYDROCODONE BITARTRATE AND HOMATROPINE METHYLBROMIDE ORAL SOLUTION 5; 1.5 MG/5ML; MG/5ML
5 LIQUID ORAL EVERY 6 HOURS PRN
Qty: 100 ML | Refills: 0 | Status: SHIPPED | OUTPATIENT
Start: 2023-01-07 | End: 2023-01-12

## 2023-01-07 RX ORDER — BUMETANIDE 1 MG/1
1 TABLET ORAL 2 TIMES DAILY
Qty: 28 TABLET | Refills: 0 | Status: SHIPPED | OUTPATIENT
Start: 2023-01-07 | End: 2023-01-21

## 2023-01-07 RX ORDER — IPRATROPIUM BROMIDE AND ALBUTEROL SULFATE 2.5; .5 MG/3ML; MG/3ML
1 SOLUTION RESPIRATORY (INHALATION)
Status: DISCONTINUED | OUTPATIENT
Start: 2023-01-07 | End: 2023-01-07 | Stop reason: HOSPADM

## 2023-01-07 RX ORDER — IPRATROPIUM BROMIDE AND ALBUTEROL SULFATE 2.5; .5 MG/3ML; MG/3ML
3 SOLUTION RESPIRATORY (INHALATION)
Qty: 360 ML | Refills: 3 | Status: SHIPPED | OUTPATIENT
Start: 2023-01-07

## 2023-01-07 RX ADMIN — IPRATROPIUM BROMIDE AND ALBUTEROL SULFATE 1 AMPULE: 2.5; .5 SOLUTION RESPIRATORY (INHALATION) at 11:13

## 2023-01-07 RX ADMIN — LEVOTHYROXINE SODIUM 50 MCG: 0.05 TABLET ORAL at 05:56

## 2023-01-07 RX ADMIN — Medication 10 ML: at 08:15

## 2023-01-07 RX ADMIN — BUMETANIDE 1 MG: 0.25 INJECTION, SOLUTION INTRAMUSCULAR; INTRAVENOUS at 08:13

## 2023-01-07 RX ADMIN — ROSUVASTATIN CALCIUM 40 MG: 20 TABLET, FILM COATED ORAL at 08:12

## 2023-01-07 RX ADMIN — PANTOPRAZOLE SODIUM 40 MG: 40 TABLET, DELAYED RELEASE ORAL at 05:56

## 2023-01-07 RX ADMIN — ROPINIROLE HYDROCHLORIDE 3 MG: 2 TABLET, FILM COATED ORAL at 08:12

## 2023-01-07 RX ADMIN — SERTRALINE HYDROCHLORIDE 100 MG: 100 TABLET, FILM COATED ORAL at 08:24

## 2023-01-07 RX ADMIN — ENOXAPARIN SODIUM 40 MG: 100 INJECTION SUBCUTANEOUS at 08:12

## 2023-01-07 RX ADMIN — HYDROCODONE BITARTRATE AND HOMATROPINE METHYLBROMIDE 5 ML: 5; 1.5 SOLUTION ORAL at 06:19

## 2023-01-07 NOTE — PROGRESS NOTES
[facility]                                Progress note    Subjective: The patient is awake and alert. No acute events overnight. Denies chest pain, angina, SOB     Objective:    /69   Pulse 84   Temp 98.4 °F (36.9 °C) (Oral)   Resp 16   Ht 5' 1\" (1.549 m)   Wt 141 lb 3.2 oz (64 kg)   SpO2 98%   BMI 26.68 kg/m²     In: 240 [P.O.:240]  Out: 1350   In: 240   Out: 1350 [Urine:1350]    General appearance: NAD, conversant  HEENT: AT/NC, MMM  Neck: FROM, supple  Lungs: Clear to auscultation  CV: RRR, no MRGs  Vasc: Radial pulses 2+  Abdomen: Soft, non-tender; no masses or HSM  Extremities: No peripheral edema or digital cyanosis  Skin: no rash, lesions or ulcers  Psych: Alert and oriented to person, place and time  Neuro: Alert and interactive     Recent Labs     01/04/23  1156 01/05/23  0531   WBC 8.8 8.4   HGB 10.4* 10.0*   HCT 31.7* 31.1*    221       Recent Labs     01/04/23  1156 01/05/23  0531 01/06/23  0836    143 144   K 3.0* 3.4* 3.1*   * 106 106   CO2 23 24 24   BUN 8 7 8   CREATININE 1.2* 1.2* 1.2*   CALCIUM 8.4* 8.1* 8.9       Assessment:    Principal Problem:    Acute congestive heart failure, unspecified heart failure type (HCC)  Active Problems:    Hypokalemia  Resolved Problems:    * No resolved hospital problems. *      Plan:  Ms. Felisa Mcadams is known to me from recent admission at [facility] at which time she had significant acute renal failure and was on the verge of dialysis, which ultimately was not needed. She did very well as far as her kidneys are concerned, she also was dealing with acute diverticulitis at that time.   Secondary to her complaints of chest heaviness followed by left arm pain the next day with acute T wave changes, cardiology was consulted,  She underwent echocardiogram which was negative  She now presents with acute shortness of breath with BNP of greater than 70,000   Continue work-up for volume overload with IV diuretic but with extreme caution given recent renal issues-although her lungs sound clear  Await results of echocardiogram to further direct care-may require more definitive ischemic evaluation with heart cath     1/6/2023  Shortness of breath significantly improved  Echocardiogram with ejection fraction 50% ±5  She feels her symptoms are from anesthesia which she has had issues with in the past  Continues to have some coughing episodes, start Hycodan as needed  Tolerating diuretic without acute renal failure, will decrease dose to 1 mg IV twice daily now that she has diuresed adequately-approximately 4 L so far since admission  Check a.m. labs  Since she had a recent stress test and was evaluated by cardiology at [facility] less than 1 week ago, will not reconsult, she may follow-up with them as an outpatient    1/7/2023  Coughing episode witnessed during exam-continue hycodan prn  Potassium 4.0 today  BUN/creatinine holding steady 9/1.2  BNP 6,873 trending down  Walking pulse ox on room air 89%-continue to supplement as needed      DVT Prophylaxis   PT/OT  Discharge planning           KOLBY Henriquez - CNP,  8:16 AM  1/7/2023     Above note edited to reflect my thoughts   I gave patient the option of continuing to stay for ongoing IV diuresis versus going home based on how she is feeling  She wishes to have nebulizer treatments at home and be discharged  IV Bumex has been transitioned to p.o. Bumex 1 mg daily given recent episode of acute renal failure, patient will have to follow-up with PCP within a few days to recheck BMP  DuoNebs, antitussives, Bumex E scribed to patient's pharmacy  She was notified to immediately return to the hospital if any worsening shortness of breath    I personally saw, examined and provided care for the patient. Radiographs, labs and medication list were reviewed by me independently. The case was discussed in detail and plans for care were established.  Review of Evins Hair APRN- CNP, documentation was conducted and revisions were made as appropriate directly by me. I agree with the above documented exam, problem list, and plan of care.     Tuyet Philip MD  6:14 PM  1/7/2023

## 2023-01-07 NOTE — PROGRESS NOTES
[facility]   Progress note      Subjective: The patient is awake and alert. Breathing is easier  Ambulated in the halls today without discomfort    Objective:    /65   Pulse 88   Temp 97 °F (36.1 °C) (Temporal)   Resp 16   Ht 5' 1\" (1.549 m)   Wt 143 lb 9.6 oz (65.1 kg)   SpO2 98%   BMI 27.13 kg/m²     In: 240 [P.O.:240]  Out: 2150   In: 240   Out: 2150 [Urine:2150]    General appearance: NAD, conversant  HEENT: AT/NC, MMM  Neck: FROM, supple  Lungs: Clear to auscultation, still coughing on inspiration  CV: RRR, no MRGs  Vasc: Radial pulses 2+  Abdomen: Soft, non-tender; no masses or HSM  Extremities: No peripheral edema or digital cyanosis  Skin: no rash, lesions or ulcers  Psych: Alert and oriented to person, place and time  Neuro: Alert and interactive     Recent Labs     01/04/23  1156 01/05/23  0531   WBC 8.8 8.4   HGB 10.4* 10.0*   HCT 31.7* 31.1*    221       Recent Labs     01/04/23  1156 01/05/23  0531 01/06/23  0836    143 144   K 3.0* 3.4* 3.1*   * 106 106   CO2 23 24 24   BUN 8 7 8   CREATININE 1.2* 1.2* 1.2*   CALCIUM 8.4* 8.1* 8.9       Assessment:    Principal Problem:    Acute congestive heart failure, unspecified heart failure type (HCC)  Active Problems:    Hypokalemia  Resolved Problems:    * No resolved hospital problems. *      Plan:    Ms. Edgard Biswas is known to me from recent admission at [facility] at which time she had significant acute renal failure and was on the verge of dialysis, which ultimately was not needed. She did very well as far as her kidneys are concerned, she also was dealing with acute diverticulitis at that time.   Secondary to her complaints of chest heaviness followed by left arm pain the next day with acute T wave changes, cardiology was consulted,  She underwent echocardiogram which was negative  She now presents with acute shortness of breath with BNP of greater than 70,000   Continue work-up for volume overload with IV diuretic but with extreme caution given recent renal issues-although her lungs sound clear  Await results of echocardiogram to further direct care-may require more definitive ischemic evaluation with heart cath    1/6/2023  Shortness of breath significantly improved  Echocardiogram with ejection fraction 50% ±5  She feels her symptoms are from anesthesia which she has had issues with in the past  Continues to have some coughing episodes, start Hycodan as needed  Tolerating diuretic without acute renal failure, will decrease dose to 1 mg IV twice daily now that she has diuresed adequately-approximately 4 L so far since admission  Check a.m. labs  Since she had a recent stress test and was evaluated by cardiology at [facility] less than 1 week ago, will not reconsult, she may follow-up with them as an outpatient      Code Status: Full  Consultants: None  DVT Prophylaxis   PT/OT  Discharge planning           Augustine Bowles MD  7:58 PM  1/6/2023
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
## 2023-01-07 NOTE — PROGRESS NOTES
Spoke with Marc Chao regarding, patient status. I&E Wheezing, cough, complaining of SOB. Patient requesting breathing treatment. Stated she would place orders.

## 2023-01-07 NOTE — PROGRESS NOTES
Spoke with Dr. Angela Puente regarding duoneb treatment , patient's pharmacy called stated ID code needed provided for coverage through medicare, she's aware called Giovanna Hahn MD aware of ambulating pulse ox, sats 89%.  Stated ok to discharge

## 2023-01-09 ENCOUNTER — HOSPITAL ENCOUNTER (OUTPATIENT)
Dept: GENERAL RADIOLOGY | Age: 73
Discharge: HOME | End: 2023-01-11
Payer: MEDICARE

## 2023-01-09 ENCOUNTER — HOSPITAL ENCOUNTER (OUTPATIENT)
Age: 73
Discharge: HOME | End: 2023-01-11
Payer: MEDICARE

## 2023-01-09 DIAGNOSIS — M25.551 RIGHT HIP PAIN: ICD-10-CM

## 2023-01-09 DIAGNOSIS — M25.552 LEFT HIP PAIN: ICD-10-CM

## 2023-01-09 PROCEDURE — 73502 X-RAY EXAM HIP UNI 2-3 VIEWS: CPT

## 2023-01-26 PROBLEM — R79.89 ELEVATED TROPONIN: Status: RESOLVED | Noted: 2022-12-27 | Resolved: 2023-01-26

## 2023-01-26 PROBLEM — R77.8 ELEVATED TROPONIN: Status: RESOLVED | Noted: 2022-12-27 | Resolved: 2023-01-26

## 2023-01-27 ENCOUNTER — HOSPITAL ENCOUNTER (OUTPATIENT)
Age: 73
End: 2023-01-27
Payer: MEDICARE

## 2023-01-27 ENCOUNTER — HOSPITAL ENCOUNTER (OUTPATIENT)
Dept: GENERAL RADIOLOGY | Age: 73
End: 2023-01-27
Payer: MEDICARE

## 2023-01-27 ENCOUNTER — TELEPHONE (OUTPATIENT)
Dept: ORTHOPEDIC SURGERY | Age: 73
End: 2023-01-27

## 2023-01-27 DIAGNOSIS — R05.9 COUGH, UNSPECIFIED TYPE: ICD-10-CM

## 2023-01-27 PROCEDURE — 71046 X-RAY EXAM CHEST 2 VIEWS: CPT

## 2023-01-27 NOTE — TELEPHONE ENCOUNTER
Call back from pt stating 4 sutures still in and hand is starting to swell.    Since pt cannot make it to our office before clinic ends Caitlin BUTT recommended to be seen on this coming Monday or go to [facility] .    Pt is going to [facility].gave address/ph# for clinic.

## 2023-01-27 NOTE — TELEPHONE ENCOUNTER
VM rec'd from pt requesting appt today to remove sutures. Call back to pt lvm advising-  Per chart. Hill Leija DO  On 1/5/23 Sutures removed, Ace bandage applied, Wrist brace applied.     Per Saira DERAS keep appt   Future Appointments   Date Time Provider Marc Gonzales   2/6/2023  8:30 AM SCHEDULE, [facility]

## 2023-02-06 ENCOUNTER — HOSPITAL ENCOUNTER (OUTPATIENT)
Dept: GENERAL RADIOLOGY | Age: 73
Discharge: HOME | End: 2023-02-08
Payer: MEDICARE

## 2023-02-06 ENCOUNTER — OFFICE VISIT (OUTPATIENT)
Dept: ORTHOPEDIC SURGERY | Age: 73
End: 2023-02-06
Payer: MEDICARE

## 2023-02-06 DIAGNOSIS — R52 PAIN: ICD-10-CM

## 2023-02-06 DIAGNOSIS — S52.501E TYPE I OR II OPEN FRACTURE OF DISTAL END OF RIGHT RADIUS WITH ROUTINE HEALING, UNSPECIFIED FRACTURE MORPHOLOGY, SUBSEQUENT ENCOUNTER: Primary | ICD-10-CM

## 2023-02-06 PROCEDURE — 99024 POSTOP FOLLOW-UP VISIT: CPT | Performed by: PHYSICIAN ASSISTANT

## 2023-02-06 PROCEDURE — 73110 X-RAY EXAM OF WRIST: CPT

## 2023-02-06 PROCEDURE — 99212 OFFICE O/P EST SF 10 MIN: CPT

## 2023-02-06 RX ORDER — METHYLPREDNISOLONE 4 MG/1
TABLET ORAL
COMMUNITY
Start: 2023-02-03

## 2023-02-06 NOTE — PROGRESS NOTES
Chief Complaint   Patient presents with    Post-Op Check     Right distal radius ORIF w/ I&D 12/10/2022. Patient has no complaints of pain today. Patient reports constant numbness in the right thumb and 4th-5th digits. OP:SURGEON: Dr. Olegario Galindo MD  DATE OF PROCEDURE: 12-10-22  PROCEDURE: 1. Irrigation debridement grade 2 open fracture right wrist including skin, subcutaneous tissue, muscle and devitalized bone    2. Open reduction internal fixation intra-articular right significantly displaced distal radius fracture     POD: 8+ weeks    Subjective:  Giorgio Navas is following up from the above surgery. She is NWB on right upper extremity. She ambulates with no assistive device. Pain to extremity is none and is not taking prescribed pain medication. She complains of intermittent numbness and tingling to the right upper extremity. Denies calf pain, chest pain, or shortness of breath. Patient is not participating in therapy at this time. She is doing well after surgery. She has been wearing the removable brace for protection when out. States that she has been working on home exercise program.    Review of Systems -  All pertinent positives/negatives per HPI     Objective:    General: Alert and oriented X 3, normocephalic atraumatic, external ears and eye normal, sclera clear, no acute distress, respirations easy and unlabored with no audible wheezes, skin warm and dry, speech and dress appropriate for noted age, affect euthymic.     Extremity:  Right Upper Extremity  Skin is clean dry and intact   no edema noted  2+ Radial pulse, fingers warm with BCR  Flex/extension intact to wrist, thumb and fingers   Finger opposition intact  Finger adduction/abduction intact  Finger crossover intact  able to make concentric fist  Actively, she can extend the wrist approximately 40 degrees and flex approximately 50 degrees without pain  4+/5  strength  Full motion with supination/pronation  Subjectively states sensation is intact to light touch over the Median Nerve, Ulnar Nerve, and Radial Nerve distribution  Incision well-healed    There were no vitals taken for this visit. XR:   3 views right wrist demonstrate ORIF right distal radius fracture with the plate and screws in stable position and alignment. No evidence of hardware loosening or failure. Only mild healing noted at the fracture site at this time. Lucency is still visualized to the dorsal ulnar aspect of the fracture line. Assessment:   Diagnosis Orders   1. Type I or II open fracture of distal end of right radius with routine healing, unspecified fracture morphology, subsequent encounter          Plan:  X-rays reviewed and discussed. Discussed with the patient that only mild healing noted on x-rays today. We will continue to watch this for possible nonunion. We will start her on calcium and vitamin D. She is a non-smoker and nondiabetic. Also discussed high-protein diet. May consider bone stimulator if only mild fracture healing again noted at her next office visit. Partial weightbearing 2-3 pounds to the right upper extremity. Continue wearing removable wrist brace for comfort and protection when out. Discussed starting OT however she would rather do an HEP which we feel is reasonable. Reviewed exercises for her today to do at home to work on range of motion/strengthening of the wrist/hand. Follow-up in 6 weeks for reevaluation and x-rays. Call for any questions or concerns. Electronically signed by AUGUSTA Wells on 2/6/2023 at 8:58 AM  Note: This report was completed using [employer] voiced recognition software. Every effort has been made to ensure accuracy; however, inadvertent computerized transcription errors may be present.

## 2023-02-06 NOTE — PATIENT INSTRUCTIONS
Partial weightbearing 2-3 pounds to the right upper extremity. Continue wearing removable wrist brace for comfort and protection when out. Reviewed exercises for her today to do at home to work on range of motion/strengthening of the wrist/hand. Calcium and vitamin D will be sent to your pharmacy. May consider bone stimulator if only mild fracture healing noted at her next office visit. Follow-up in 6 weeks for reevaluation and x-rays. Call for any questions or concerns.

## 2023-03-02 ENCOUNTER — TELEPHONE (OUTPATIENT)
Dept: ORTHOPEDIC SURGERY | Age: 73
End: 2023-03-02

## 2023-03-02 DIAGNOSIS — S52.501E TYPE I OR II OPEN FRACTURE OF DISTAL END OF RIGHT RADIUS WITH ROUTINE HEALING, UNSPECIFIED FRACTURE MORPHOLOGY, SUBSEQUENT ENCOUNTER: Primary | ICD-10-CM

## 2023-03-02 NOTE — TELEPHONE ENCOUNTER
Called and spoke with patient. She should like to have the referral sent to [facility] in [address], [address].      Future Appointments   Date Time Provider Marc Gonzales   3/23/2023  8:45 AM Paul Mcelroy DO [address] [facility],[address] and routed

## 2023-03-02 NOTE — TELEPHONE ENCOUNTER
Patient message states that she would like a referral for outpatient PT.      Future Appointments   Date Time Provider Marc Gonzales   3/23/2023  8:45  [facility]

## 2023-03-17 ENCOUNTER — HOSPITAL ENCOUNTER (OUTPATIENT)
Age: 73
Discharge: HOME | End: 2023-03-19

## 2023-03-17 LAB
ABO + RH BLD: NORMAL
BLD GP AB SCN SERPL QL: NORMAL

## 2023-03-17 PROCEDURE — 87081 CULTURE SCREEN ONLY: CPT

## 2023-03-17 PROCEDURE — 86850 RBC ANTIBODY SCREEN: CPT

## 2023-03-17 PROCEDURE — 86901 BLOOD TYPING SEROLOGIC RH(D): CPT

## 2023-03-17 PROCEDURE — 86900 BLOOD TYPING SEROLOGIC ABO: CPT

## 2023-03-19 LAB — MRSA SPEC QL CULT: NORMAL

## 2023-03-22 DIAGNOSIS — S52.501E TYPE I OR II OPEN FRACTURE OF DISTAL END OF RIGHT RADIUS WITH ROUTINE HEALING, UNSPECIFIED FRACTURE MORPHOLOGY, SUBSEQUENT ENCOUNTER: Primary | ICD-10-CM

## 2023-03-23 ENCOUNTER — OFFICE VISIT (OUTPATIENT)
Dept: ORTHOPEDIC SURGERY | Age: 73
End: 2023-03-23
Payer: MEDICARE

## 2023-03-23 ENCOUNTER — HOSPITAL ENCOUNTER (OUTPATIENT)
Dept: GENERAL RADIOLOGY | Age: 73
Discharge: HOME | End: 2023-03-25
Payer: MEDICARE

## 2023-03-23 VITALS — BODY MASS INDEX: 26.43 KG/M2 | HEIGHT: 61 IN | WEIGHT: 140 LBS

## 2023-03-23 DIAGNOSIS — S52.501E TYPE I OR II OPEN FRACTURE OF DISTAL END OF RIGHT RADIUS WITH ROUTINE HEALING, UNSPECIFIED FRACTURE MORPHOLOGY, SUBSEQUENT ENCOUNTER: Primary | ICD-10-CM

## 2023-03-23 DIAGNOSIS — S52.501E TYPE I OR II OPEN FRACTURE OF DISTAL END OF RIGHT RADIUS WITH ROUTINE HEALING, UNSPECIFIED FRACTURE MORPHOLOGY, SUBSEQUENT ENCOUNTER: ICD-10-CM

## 2023-03-23 PROCEDURE — 73110 X-RAY EXAM OF WRIST: CPT

## 2023-03-23 PROCEDURE — 99024 POSTOP FOLLOW-UP VISIT: CPT | Performed by: PHYSICIAN ASSISTANT

## 2023-03-23 PROCEDURE — 99212 OFFICE O/P EST SF 10 MIN: CPT | Performed by: PHYSICIAN ASSISTANT

## 2023-03-23 NOTE — PROGRESS NOTES
Chief Complaint   Patient presents with    Follow-up     Patient presents from home for follow up R distal radius ORIF. Patient reports numbness in finger tips but good ROM and strength. OP:SURGEON: Dr. Sun Morgan MD  DATE OF PROCEDURE: 12-10-22  PROCEDURE: 1. Irrigation debridement grade 2 open fracture right wrist including skin, subcutaneous tissue, muscle and devitalized bone    2. Open reduction internal fixation intra-articular right significantly displaced distal radius fracture    Subjective:  Emelia Craig is approximately 3.5 mo follow-up from the above surgery. She is doing well post-op with mild, but controllable pain intermittently in the R wrist and making improvement with outpatient therapy, but says she still has paresthesias throughout her fingers and this makes grasping and holding objects difficult. She is PWB R UE. Denies calf pain, CP, SOB, fever, chills, malaise. Review of Systems -  all pertinent positives and negatives in HPI. Objective:    General: Alert and oriented X 3, normocephalic atraumatic, external ears and eye normal, sclera clear, no acute distress, respirations easy and unlabored with no audible wheezes, skin warm and dry, speech and dress appropriate for noted age, affect euthymic.     Extremity:  Right Upper Extremity  Skin is clean dry and intact  Mild edema noted at the wrist, hand, fingers  Able to actively make fist, but wrist is still somewhat stiff  Mild TTP at the ulnar aspect of the wrist    strength 4/5  Radial pulse palpable, fingers warm with BCR  Flex/extension intact to wrist, thumb and fingers  Finger opposition intact  Finger adduction/abduction intact  Finger crossover intact  Subjectively states sensation intact to radial/medial/ulnar distribution  Incision healed    Ht 5' 1\" (1.549 m)   Wt 140 lb (63.5 kg)   BMI 26.45 kg/m²     XR:   Narrative   EXAMINATION:   3 XRAY VIEWS OF THE RIGHT WRIST       3/23/2023 9:29 am

## 2023-03-27 ENCOUNTER — HOSPITAL ENCOUNTER (OUTPATIENT)
Age: 73
Discharge: HOME | End: 2023-03-29

## 2023-03-27 PROCEDURE — 88307 TISSUE EXAM BY PATHOLOGIST: CPT

## 2023-03-27 PROCEDURE — 88305 TISSUE EXAM BY PATHOLOGIST: CPT

## 2023-03-28 ENCOUNTER — HOSPITAL ENCOUNTER (OUTPATIENT)
Age: 73
Discharge: HOME | End: 2023-03-30

## 2023-03-28 LAB
ANION GAP SERPL CALCULATED.3IONS-SCNC: 9 MMOL/L (ref 7–16)
BASOPHILS # BLD: 0.02 E9/L (ref 0–0.2)
BASOPHILS NFR BLD: 0.2 % (ref 0–2)
BUN SERPL-MCNC: 19 MG/DL (ref 6–23)
CALCIUM SERPL-MCNC: 8.3 MG/DL (ref 8.6–10.2)
CHLORIDE SERPL-SCNC: 109 MMOL/L (ref 98–107)
CO2 SERPL-SCNC: 20 MMOL/L (ref 22–29)
CREAT SERPL-MCNC: 1.3 MG/DL (ref 0.5–1)
EOSINOPHIL # BLD: 0 E9/L (ref 0.05–0.5)
EOSINOPHIL NFR BLD: 0 % (ref 0–6)
ERYTHROCYTE [DISTWIDTH] IN BLOOD BY AUTOMATED COUNT: 15.9 FL (ref 11.5–15)
GLUCOSE SERPL-MCNC: 124 MG/DL (ref 74–99)
HCT VFR BLD AUTO: 29.9 % (ref 34–48)
HGB BLD-MCNC: 9.1 G/DL (ref 11.5–15.5)
IMM GRANULOCYTES # BLD: 0.03 E9/L
IMM GRANULOCYTES NFR BLD: 0.4 % (ref 0–5)
LYMPHOCYTES # BLD: 0.94 E9/L (ref 1.5–4)
LYMPHOCYTES NFR BLD: 11.5 % (ref 20–42)
MCH RBC QN AUTO: 26.6 PG (ref 26–35)
MCHC RBC AUTO-ENTMCNC: 30.4 % (ref 32–34.5)
MCV RBC AUTO: 87.4 FL (ref 80–99.9)
MONOCYTES # BLD: 0.35 E9/L (ref 0.1–0.95)
MONOCYTES NFR BLD: 4.3 % (ref 2–12)
NEUTROPHILS # BLD: 6.86 E9/L (ref 1.8–7.3)
NEUTS SEG NFR BLD: 83.6 % (ref 43–80)
PLATELET # BLD AUTO: 217 E9/L (ref 130–450)
PMV BLD AUTO: 10.7 FL (ref 7–12)
POTASSIUM SERPL-SCNC: 4.7 MMOL/L (ref 3.5–5)
RBC # BLD AUTO: 3.42 E12/L (ref 3.5–5.5)
SODIUM SERPL-SCNC: 138 MMOL/L (ref 132–146)
WBC # BLD: 8.2 E9/L (ref 4.5–11.5)

## 2023-03-28 PROCEDURE — 85025 COMPLETE CBC W/AUTO DIFF WBC: CPT

## 2023-03-28 PROCEDURE — 80048 BASIC METABOLIC PNL TOTAL CA: CPT

## 2023-03-29 ENCOUNTER — HOSPITAL ENCOUNTER (OUTPATIENT)
Age: 73
Discharge: HOME | End: 2023-03-31

## 2023-03-29 LAB
ANION GAP SERPL CALCULATED.3IONS-SCNC: 7 MMOL/L (ref 7–16)
BASOPHILS # BLD: 0.04 E9/L (ref 0–0.2)
BASOPHILS NFR BLD: 0.5 % (ref 0–2)
BUN SERPL-MCNC: 15 MG/DL (ref 6–23)
CALCIUM SERPL-MCNC: 8.3 MG/DL (ref 8.6–10.2)
CHLORIDE SERPL-SCNC: 114 MMOL/L (ref 98–107)
CO2 SERPL-SCNC: 20 MMOL/L (ref 22–29)
CREAT SERPL-MCNC: 1.2 MG/DL (ref 0.5–1)
EOSINOPHIL # BLD: 0.02 E9/L (ref 0.05–0.5)
EOSINOPHIL NFR BLD: 0.2 % (ref 0–6)
ERYTHROCYTE [DISTWIDTH] IN BLOOD BY AUTOMATED COUNT: 16.3 FL (ref 11.5–15)
GLUCOSE SERPL-MCNC: 92 MG/DL (ref 74–99)
HCT VFR BLD AUTO: 25.6 % (ref 34–48)
HCT VFR BLD AUTO: 26.3 % (ref 34–48)
HGB BLD-MCNC: 7.9 G/DL (ref 11.5–15.5)
HGB BLD-MCNC: 8.1 G/DL (ref 11.5–15.5)
IMM GRANULOCYTES # BLD: 0.04 E9/L
IMM GRANULOCYTES NFR BLD: 0.5 % (ref 0–5)
LYMPHOCYTES # BLD: 1.9 E9/L (ref 1.5–4)
LYMPHOCYTES NFR BLD: 23.4 % (ref 20–42)
MCH RBC QN AUTO: 27.1 PG (ref 26–35)
MCHC RBC AUTO-ENTMCNC: 30.9 % (ref 32–34.5)
MCV RBC AUTO: 87.7 FL (ref 80–99.9)
MONOCYTES # BLD: 0.61 E9/L (ref 0.1–0.95)
MONOCYTES NFR BLD: 7.5 % (ref 2–12)
NEUTROPHILS # BLD: 5.51 E9/L (ref 1.8–7.3)
NEUTS SEG NFR BLD: 67.9 % (ref 43–80)
PLATELET # BLD AUTO: 200 E9/L (ref 130–450)
PMV BLD AUTO: 10.6 FL (ref 7–12)
POTASSIUM SERPL-SCNC: 3.9 MMOL/L (ref 3.5–5)
RBC # BLD AUTO: 2.92 E12/L (ref 3.5–5.5)
SODIUM SERPL-SCNC: 141 MMOL/L (ref 132–146)
WBC # BLD: 8.1 E9/L (ref 4.5–11.5)

## 2023-03-29 PROCEDURE — 80048 BASIC METABOLIC PNL TOTAL CA: CPT

## 2023-03-29 PROCEDURE — 85025 COMPLETE CBC W/AUTO DIFF WBC: CPT

## 2023-03-29 PROCEDURE — 85014 HEMATOCRIT: CPT

## 2023-03-29 PROCEDURE — 85018 HEMOGLOBIN: CPT

## 2023-03-30 ENCOUNTER — HOSPITAL ENCOUNTER (OUTPATIENT)
Age: 73
Discharge: HOME | End: 2023-04-01

## 2023-03-30 LAB
ANION GAP SERPL CALCULATED.3IONS-SCNC: 7 MMOL/L (ref 7–16)
BASOPHILS # BLD: 0.03 E9/L (ref 0–0.2)
BASOPHILS NFR BLD: 0.4 % (ref 0–2)
BUN SERPL-MCNC: 10 MG/DL (ref 6–23)
CALCIUM SERPL-MCNC: 8.3 MG/DL (ref 8.6–10.2)
CHLORIDE SERPL-SCNC: 113 MMOL/L (ref 98–107)
CO2 SERPL-SCNC: 22 MMOL/L (ref 22–29)
CREAT SERPL-MCNC: 0.9 MG/DL (ref 0.5–1)
EOSINOPHIL # BLD: 0.09 E9/L (ref 0.05–0.5)
EOSINOPHIL NFR BLD: 1.3 % (ref 0–6)
ERYTHROCYTE [DISTWIDTH] IN BLOOD BY AUTOMATED COUNT: 16.7 FL (ref 11.5–15)
GLUCOSE SERPL-MCNC: 87 MG/DL (ref 74–99)
HCT VFR BLD AUTO: 23.3 % (ref 34–48)
HCT VFR BLD AUTO: 24.7 % (ref 34–48)
HGB BLD-MCNC: 7.1 G/DL (ref 11.5–15.5)
HGB BLD-MCNC: 7.8 G/DL (ref 11.5–15.5)
IMM GRANULOCYTES # BLD: 0.03 E9/L
IMM GRANULOCYTES NFR BLD: 0.4 % (ref 0–5)
LYMPHOCYTES # BLD: 2.05 E9/L (ref 1.5–4)
LYMPHOCYTES NFR BLD: 30.3 % (ref 20–42)
MCH RBC QN AUTO: 26.7 PG (ref 26–35)
MCHC RBC AUTO-ENTMCNC: 30.5 % (ref 32–34.5)
MCV RBC AUTO: 87.6 FL (ref 80–99.9)
MONOCYTES # BLD: 0.48 E9/L (ref 0.1–0.95)
MONOCYTES NFR BLD: 7.1 % (ref 2–12)
NEUTROPHILS # BLD: 4.09 E9/L (ref 1.8–7.3)
NEUTS SEG NFR BLD: 60.5 % (ref 43–80)
PLATELET # BLD AUTO: 153 E9/L (ref 130–450)
PMV BLD AUTO: 10.5 FL (ref 7–12)
POTASSIUM SERPL-SCNC: 3.8 MMOL/L (ref 3.5–5)
RBC # BLD AUTO: 2.66 E12/L (ref 3.5–5.5)
SODIUM SERPL-SCNC: 142 MMOL/L (ref 132–146)
WBC # BLD: 6.8 E9/L (ref 4.5–11.5)

## 2023-03-30 PROCEDURE — 80048 BASIC METABOLIC PNL TOTAL CA: CPT

## 2023-03-30 PROCEDURE — 85025 COMPLETE CBC W/AUTO DIFF WBC: CPT

## 2023-03-30 PROCEDURE — 85018 HEMOGLOBIN: CPT

## 2023-03-30 PROCEDURE — 85014 HEMATOCRIT: CPT

## 2024-05-22 ENCOUNTER — HOSPITAL ENCOUNTER (OUTPATIENT)
Dept: ULTRASOUND IMAGING | Age: 74
Discharge: HOME | End: 2024-05-24
Payer: MEDICARE

## 2024-05-22 DIAGNOSIS — R10.9 STOMACH ACHE: ICD-10-CM

## 2024-05-22 PROCEDURE — 76705 ECHO EXAM OF ABDOMEN: CPT

## 2024-06-07 ENCOUNTER — HOSPITAL ENCOUNTER (OUTPATIENT)
Age: 74
End: 2024-06-07
Payer: MEDICARE

## 2024-06-07 ENCOUNTER — HOSPITAL ENCOUNTER (OUTPATIENT)
Dept: GENERAL RADIOLOGY | Age: 74
End: 2024-06-07
Payer: MEDICARE

## 2024-06-07 DIAGNOSIS — R05.9 COUGH, UNSPECIFIED TYPE: ICD-10-CM

## 2024-06-07 DIAGNOSIS — M25.511 RIGHT SHOULDER PAIN, UNSPECIFIED CHRONICITY: ICD-10-CM

## 2024-06-07 PROCEDURE — 71046 X-RAY EXAM CHEST 2 VIEWS: CPT

## 2024-06-07 PROCEDURE — 73030 X-RAY EXAM OF SHOULDER: CPT

## 2024-07-09 ENCOUNTER — HOSPITAL ENCOUNTER (OUTPATIENT)
Age: 74
Discharge: HOME | End: 2024-07-11

## 2024-07-17 ENCOUNTER — HOSPITAL ENCOUNTER (OUTPATIENT)
Dept: NUCLEAR MEDICINE | Age: 74
Discharge: HOME | End: 2024-07-17
Attending: SURGERY
Payer: MEDICARE

## 2024-07-17 VITALS — BODY MASS INDEX: 27.4 KG/M2 | WEIGHT: 145 LBS

## 2024-07-17 DIAGNOSIS — R10.13 EPIGASTRIC PAIN: ICD-10-CM

## 2024-07-17 DIAGNOSIS — R10.9 ABDOMINAL PAIN, UNSPECIFIED ABDOMINAL LOCATION: ICD-10-CM

## 2024-07-17 LAB — SURGICAL PATHOLOGY REPORT: NORMAL

## 2024-07-17 PROCEDURE — A9537 TC99M MEBROFENIN: HCPCS | Performed by: RADIOLOGY

## 2024-07-17 PROCEDURE — 2580000003 HC RX 258: Performed by: RADIOLOGY

## 2024-07-17 PROCEDURE — 78227 HEPATOBIL SYST IMAGE W/DRUG: CPT

## 2024-07-17 PROCEDURE — 3430000000 HC RX DIAGNOSTIC RADIOPHARMACEUTICAL: Performed by: RADIOLOGY

## 2024-07-17 PROCEDURE — 6360000002 HC RX W HCPCS: Performed by: RADIOLOGY

## 2024-07-17 RX ADMIN — Medication 6 MILLICURIE: at 10:20

## 2024-07-17 RX ADMIN — SINCALIDE 1.32 MCG: 5 INJECTION, POWDER, LYOPHILIZED, FOR SOLUTION INTRAVENOUS at 11:35

## 2024-08-05 ENCOUNTER — HOSPITAL ENCOUNTER (OUTPATIENT)
Age: 74
Discharge: HOME | End: 2024-08-07

## 2024-08-12 LAB — SURGICAL PATHOLOGY REPORT: NORMAL

## 2024-09-30 ENCOUNTER — HOSPITAL ENCOUNTER (OUTPATIENT)
Dept: GENERAL RADIOLOGY | Age: 74
Discharge: HOME | End: 2024-10-02
Attending: FAMILY MEDICINE
Payer: MEDICARE

## 2024-09-30 ENCOUNTER — HOSPITAL ENCOUNTER (OUTPATIENT)
Age: 74
Discharge: HOME | End: 2024-10-02
Payer: MEDICARE

## 2024-09-30 DIAGNOSIS — M25.561 PAIN, JOINT, KNEE, RIGHT: ICD-10-CM

## 2024-09-30 DIAGNOSIS — M79.604 RIGHT LEG PAIN: ICD-10-CM

## 2024-09-30 PROCEDURE — 73590 X-RAY EXAM OF LOWER LEG: CPT

## 2024-09-30 PROCEDURE — 73564 X-RAY EXAM KNEE 4 OR MORE: CPT

## 2025-05-13 ENCOUNTER — HOSPITAL ENCOUNTER (OUTPATIENT)
Dept: GENERAL RADIOLOGY | Age: 75
Discharge: HOME | End: 2025-05-15
Payer: MEDICARE

## 2025-05-13 ENCOUNTER — TRANSCRIBE ORDERS (OUTPATIENT)
Dept: GENERAL RADIOLOGY | Age: 75
End: 2025-05-13

## 2025-05-13 DIAGNOSIS — M25.562 LEFT KNEE PAIN, UNSPECIFIED CHRONICITY: ICD-10-CM

## 2025-05-13 DIAGNOSIS — M25.562 LEFT KNEE PAIN, UNSPECIFIED CHRONICITY: Primary | ICD-10-CM

## 2025-05-13 PROCEDURE — 73564 X-RAY EXAM KNEE 4 OR MORE: CPT

## 2025-08-28 ENCOUNTER — HOSPITAL ENCOUNTER (OUTPATIENT)
Dept: LAB | Age: 75
Setting detail: SPECIMEN
Discharge: HOME | End: 2025-08-28

## 2025-08-29 LAB
C DIFF GDH + TOXINS A+B STL QL IA.RAPID: NEGATIVE
SPECIMEN DESCRIPTION: NORMAL

## 2025-08-30 LAB
MICROORGANISM SPEC CULT: NORMAL
MICROORGANISM SPEC CULT: NORMAL
SPECIMEN DESCRIPTION: NORMAL

## (undated) DEVICE — BNDG,ELSTC,MATRIX,STRL,3"X5YD,LF,HOOK&LP: Brand: MEDLINE

## (undated) DEVICE — BIT DRL L100MM DIA2MM QUIK CPL W/O STP REUSE

## (undated) DEVICE — PADDING,UNDERCAST,COTTON, 4"X4YD STERILE: Brand: MEDLINE

## (undated) DEVICE — Device

## (undated) DEVICE — GOWN,AURORA,BRTHSLV,2XL,18/CS: Brand: MEDLINE

## (undated) DEVICE — GLOVE ORANGE PI 8   MSG9080

## (undated) DEVICE — ZIMMER® STERILE DISPOSABLE TOURNIQUET CUFF WITH PROTECTIVE SLEEVE AND PLC, DUAL PORT, SINGLE BLADDER, 18 IN. (46 CM)

## (undated) DEVICE — UPPER EXTREMITY: Brand: MEDLINE INDUSTRIES, INC.

## (undated) DEVICE — GAMMEX® NON-LATEX PI ORTHO SIZE 8, STERILE POLYISOPRENE POWDER-FREE SURGICAL GLOVE: Brand: GAMMEX

## (undated) DEVICE — DRAPE,HAND,STERILE: Brand: MEDLINE

## (undated) DEVICE — PADDING,UNDERCAST,COTTON, 3X4YD STERILE: Brand: MEDLINE